# Patient Record
Sex: MALE | Race: BLACK OR AFRICAN AMERICAN | NOT HISPANIC OR LATINO | Employment: OTHER | URBAN - NONMETROPOLITAN AREA
[De-identification: names, ages, dates, MRNs, and addresses within clinical notes are randomized per-mention and may not be internally consistent; named-entity substitution may affect disease eponyms.]

---

## 2020-06-30 ENCOUNTER — LAB (OUTPATIENT)
Dept: LAB | Facility: HOSPITAL | Age: 75
End: 2020-06-30

## 2020-06-30 ENCOUNTER — OFFICE VISIT (OUTPATIENT)
Dept: INTERNAL MEDICINE | Facility: CLINIC | Age: 75
End: 2020-06-30

## 2020-06-30 ENCOUNTER — RESULTS ENCOUNTER (OUTPATIENT)
Dept: INTERNAL MEDICINE | Facility: CLINIC | Age: 75
End: 2020-06-30

## 2020-06-30 VITALS
DIASTOLIC BLOOD PRESSURE: 63 MMHG | HEIGHT: 65 IN | WEIGHT: 153.8 LBS | OXYGEN SATURATION: 98 % | RESPIRATION RATE: 18 BRPM | TEMPERATURE: 98.7 F | SYSTOLIC BLOOD PRESSURE: 116 MMHG | BODY MASS INDEX: 25.62 KG/M2 | HEART RATE: 73 BPM

## 2020-06-30 DIAGNOSIS — I10 ESSENTIAL HYPERTENSION: ICD-10-CM

## 2020-06-30 DIAGNOSIS — Z12.11 COLON CANCER SCREENING: ICD-10-CM

## 2020-06-30 DIAGNOSIS — R79.9 ABNORMAL FINDING OF BLOOD CHEMISTRY, UNSPECIFIED: ICD-10-CM

## 2020-06-30 DIAGNOSIS — E55.9 VITAMIN D DEFICIENCY: ICD-10-CM

## 2020-06-30 DIAGNOSIS — K21.9 GASTROESOPHAGEAL REFLUX DISEASE, ESOPHAGITIS PRESENCE NOT SPECIFIED: ICD-10-CM

## 2020-06-30 DIAGNOSIS — I10 ESSENTIAL HYPERTENSION: Primary | ICD-10-CM

## 2020-06-30 DIAGNOSIS — Z11.59 ENCOUNTER FOR HEPATITIS C SCREENING TEST FOR LOW RISK PATIENT: ICD-10-CM

## 2020-06-30 DIAGNOSIS — J45.40 MODERATE PERSISTENT ASTHMA WITHOUT COMPLICATION: ICD-10-CM

## 2020-06-30 DIAGNOSIS — E78.5 HYPERLIPIDEMIA, UNSPECIFIED HYPERLIPIDEMIA TYPE: ICD-10-CM

## 2020-06-30 DIAGNOSIS — N40.0 BENIGN PROSTATIC HYPERPLASIA, UNSPECIFIED WHETHER LOWER URINARY TRACT SYMPTOMS PRESENT: ICD-10-CM

## 2020-06-30 LAB
25(OH)D3 SERPL-MCNC: 52.2 NG/ML (ref 30–100)
BASOPHILS # BLD AUTO: 0.05 10*3/MM3 (ref 0–0.2)
BASOPHILS NFR BLD AUTO: 0.7 % (ref 0–1.5)
DEPRECATED RDW RBC AUTO: 43.7 FL (ref 37–54)
EOSINOPHIL # BLD AUTO: 0.11 10*3/MM3 (ref 0–0.4)
EOSINOPHIL NFR BLD AUTO: 1.5 % (ref 0.3–6.2)
ERYTHROCYTE [DISTWIDTH] IN BLOOD BY AUTOMATED COUNT: 12.5 % (ref 12.3–15.4)
HBA1C MFR BLD: 6.2 % (ref 4.8–5.6)
HCT VFR BLD AUTO: 44.3 % (ref 37.5–51)
HCV AB SER DONR QL: NORMAL
HGB BLD-MCNC: 15.1 G/DL (ref 13–17.7)
IMM GRANULOCYTES # BLD AUTO: 0.02 10*3/MM3 (ref 0–0.05)
IMM GRANULOCYTES NFR BLD AUTO: 0.3 % (ref 0–0.5)
LYMPHOCYTES # BLD AUTO: 1.2 10*3/MM3 (ref 0.7–3.1)
LYMPHOCYTES NFR BLD AUTO: 15.9 % (ref 19.6–45.3)
MCH RBC QN AUTO: 32.3 PG (ref 26.6–33)
MCHC RBC AUTO-ENTMCNC: 34.1 G/DL (ref 31.5–35.7)
MCV RBC AUTO: 94.9 FL (ref 79–97)
MONOCYTES # BLD AUTO: 0.91 10*3/MM3 (ref 0.1–0.9)
MONOCYTES NFR BLD AUTO: 12.1 % (ref 5–12)
NEUTROPHILS NFR BLD AUTO: 5.26 10*3/MM3 (ref 1.7–7)
NEUTROPHILS NFR BLD AUTO: 69.5 % (ref 42.7–76)
NRBC BLD AUTO-RTO: 0 /100 WBC (ref 0–0.2)
PLATELET # BLD AUTO: 251 10*3/MM3 (ref 140–450)
PMV BLD AUTO: 10.3 FL (ref 6–12)
RBC # BLD AUTO: 4.67 10*6/MM3 (ref 4.14–5.8)
WBC # BLD AUTO: 7.55 10*3/MM3 (ref 3.4–10.8)

## 2020-06-30 PROCEDURE — 36415 COLL VENOUS BLD VENIPUNCTURE: CPT

## 2020-06-30 PROCEDURE — 82306 VITAMIN D 25 HYDROXY: CPT | Performed by: INTERNAL MEDICINE

## 2020-06-30 PROCEDURE — 86803 HEPATITIS C AB TEST: CPT | Performed by: INTERNAL MEDICINE

## 2020-06-30 PROCEDURE — 99204 OFFICE O/P NEW MOD 45 MIN: CPT | Performed by: INTERNAL MEDICINE

## 2020-06-30 PROCEDURE — 80061 LIPID PANEL: CPT | Performed by: INTERNAL MEDICINE

## 2020-06-30 PROCEDURE — 80053 COMPREHEN METABOLIC PANEL: CPT | Performed by: INTERNAL MEDICINE

## 2020-06-30 PROCEDURE — 85025 COMPLETE CBC W/AUTO DIFF WBC: CPT | Performed by: INTERNAL MEDICINE

## 2020-06-30 PROCEDURE — 83036 HEMOGLOBIN GLYCOSYLATED A1C: CPT | Performed by: INTERNAL MEDICINE

## 2020-06-30 RX ORDER — ASCORBIC ACID 500 MG
500 TABLET ORAL DAILY
COMMUNITY
End: 2020-06-30 | Stop reason: SDUPTHER

## 2020-06-30 RX ORDER — BUDESONIDE 0.5 MG/2ML
0.5 INHALANT ORAL 2 TIMES DAILY
Qty: 100 EACH | Refills: 5 | Status: SHIPPED | OUTPATIENT
Start: 2020-06-30 | End: 2021-05-11 | Stop reason: SDUPTHER

## 2020-06-30 RX ORDER — ASCORBIC ACID 500 MG
500 TABLET ORAL DAILY
Qty: 30 TABLET | Refills: 5 | Status: SHIPPED | OUTPATIENT
Start: 2020-06-30

## 2020-06-30 RX ORDER — ALBUTEROL SULFATE 1.25 MG/3ML
1 SOLUTION RESPIRATORY (INHALATION) EVERY 6 HOURS PRN
COMMUNITY
End: 2020-06-30 | Stop reason: SDUPTHER

## 2020-06-30 RX ORDER — ASPIRIN 81 MG/1
81 TABLET ORAL DAILY
Qty: 30 TABLET | Refills: 5 | Status: SHIPPED | OUTPATIENT
Start: 2020-06-30

## 2020-06-30 RX ORDER — LOSARTAN POTASSIUM 50 MG/1
50 TABLET ORAL DAILY
COMMUNITY
End: 2020-06-30 | Stop reason: SDUPTHER

## 2020-06-30 RX ORDER — ALBUTEROL SULFATE 90 UG/1
2 AEROSOL, METERED RESPIRATORY (INHALATION) EVERY 6 HOURS PRN
Qty: 1 INHALER | Refills: 11 | Status: SHIPPED | OUTPATIENT
Start: 2020-06-30 | End: 2021-02-04 | Stop reason: CLARIF

## 2020-06-30 RX ORDER — LOSARTAN POTASSIUM 50 MG/1
50 TABLET ORAL DAILY
Qty: 30 TABLET | Refills: 5 | Status: SHIPPED | OUTPATIENT
Start: 2020-06-30 | End: 2020-12-18 | Stop reason: SDUPTHER

## 2020-06-30 RX ORDER — TAMSULOSIN HYDROCHLORIDE 0.4 MG/1
1 CAPSULE ORAL DAILY
Qty: 30 CAPSULE | Refills: 5 | Status: SHIPPED | OUTPATIENT
Start: 2020-06-30 | End: 2020-12-18 | Stop reason: SDUPTHER

## 2020-06-30 RX ORDER — HYDROCHLOROTHIAZIDE 12.5 MG/1
12.5 CAPSULE, GELATIN COATED ORAL EVERY MORNING
Qty: 30 CAPSULE | Refills: 5 | Status: SHIPPED | OUTPATIENT
Start: 2020-06-30 | End: 2020-12-18 | Stop reason: SDUPTHER

## 2020-06-30 RX ORDER — BUDESONIDE AND FORMOTEROL FUMARATE DIHYDRATE 160; 4.5 UG/1; UG/1
2 AEROSOL RESPIRATORY (INHALATION)
Qty: 1 INHALER | Refills: 12 | Status: SHIPPED | OUTPATIENT
Start: 2020-06-30 | End: 2020-06-30

## 2020-06-30 RX ORDER — MELATONIN
1000 DAILY
Qty: 30 TABLET | Refills: 5 | Status: SHIPPED | OUTPATIENT
Start: 2020-06-30

## 2020-06-30 RX ORDER — ASPIRIN 81 MG/1
81 TABLET ORAL DAILY
COMMUNITY
End: 2020-06-30 | Stop reason: SDUPTHER

## 2020-06-30 RX ORDER — MONTELUKAST SODIUM 10 MG/1
10 TABLET ORAL NIGHTLY
COMMUNITY
End: 2020-06-30 | Stop reason: SDUPTHER

## 2020-06-30 RX ORDER — SIMVASTATIN 20 MG
20 TABLET ORAL NIGHTLY
Qty: 30 TABLET | Refills: 5 | Status: SHIPPED | OUTPATIENT
Start: 2020-06-30 | End: 2020-12-18 | Stop reason: SDUPTHER

## 2020-06-30 RX ORDER — SIMVASTATIN 20 MG
20 TABLET ORAL NIGHTLY
COMMUNITY
End: 2020-06-30 | Stop reason: SDUPTHER

## 2020-06-30 RX ORDER — PANTOPRAZOLE SODIUM 40 MG/1
40 TABLET, DELAYED RELEASE ORAL DAILY
Qty: 30 TABLET | Refills: 5 | Status: SHIPPED | OUTPATIENT
Start: 2020-06-30 | End: 2020-12-18 | Stop reason: SDUPTHER

## 2020-06-30 RX ORDER — TAMSULOSIN HYDROCHLORIDE 0.4 MG/1
1 CAPSULE ORAL DAILY
COMMUNITY
End: 2020-06-30 | Stop reason: SDUPTHER

## 2020-06-30 RX ORDER — ALBUTEROL SULFATE 1.25 MG/3ML
1 SOLUTION RESPIRATORY (INHALATION) EVERY 6 HOURS PRN
Qty: 100 VIAL | Refills: 5 | Status: SHIPPED | OUTPATIENT
Start: 2020-06-30 | End: 2021-05-11 | Stop reason: SDUPTHER

## 2020-06-30 RX ORDER — HYDROCHLOROTHIAZIDE 12.5 MG/1
12.5 CAPSULE, GELATIN COATED ORAL EVERY MORNING
COMMUNITY
End: 2020-06-30 | Stop reason: SDUPTHER

## 2020-06-30 RX ORDER — ALBUTEROL SULFATE 90 UG/1
2 AEROSOL, METERED RESPIRATORY (INHALATION) EVERY 6 HOURS PRN
COMMUNITY
End: 2020-06-30 | Stop reason: SDUPTHER

## 2020-06-30 RX ORDER — MONTELUKAST SODIUM 10 MG/1
10 TABLET ORAL NIGHTLY
Qty: 30 TABLET | Refills: 5 | Status: SHIPPED | OUTPATIENT
Start: 2020-06-30

## 2020-06-30 RX ORDER — PANTOPRAZOLE SODIUM 40 MG/1
40 TABLET, DELAYED RELEASE ORAL DAILY
COMMUNITY
End: 2020-06-30 | Stop reason: SDUPTHER

## 2020-06-30 NOTE — PROGRESS NOTES
Chief Complaint   Patient presents with   • Establish Care         History:  Abhishek Mendieta is a 74 y.o. male who presents today for evaluation of the above problems.      Patient is a pleasant 74-year-old male here to establish care with me.  He moved from [place] on June 5, 2020.  He has BPH, hyperlipidemia, hypertension and asthma.  He is due for labs and unfortunately I do not have any records from his previous provider.    His blood pressure has been currently well controlled with hydrochlorothiazide 12.5 mg daily, losartan 50 mg daily.    He has moderate persistent asthma.  He has been using his albuterol nebulizer about 4 times a day.  In addition he has been using Pulmicort twice a day.  He was previously on Symbicort but this was not helpful.  I suspect it was due to poor effort, or inability to use the inhaler correctly.    He is on simvastatin 20 mg daily for hyperlipidemia    He has never had a heart attack or stroke    He is accompanied by his younger sister at the office today as the patient is a poor historian    He has never had a colonoscopy but is interested in a Cologuard.          HPI    ROS:  Review of Systems   Constitutional: Negative for activity change, appetite change, fatigue, fever and unexpected weight change.   HENT: Negative for nosebleeds, sore throat and trouble swallowing.    Eyes: Negative for pain and visual disturbance.   Respiratory: Positive for shortness of breath and wheezing. Negative for cough and chest tightness.    Cardiovascular: Negative for chest pain, palpitations and leg swelling.   Gastrointestinal: Negative for abdominal pain, constipation, diarrhea, nausea and vomiting.   Endocrine: Negative for cold intolerance and heat intolerance.   Genitourinary: Negative.  Negative for hematuria.   Musculoskeletal: Negative.  Negative for back pain, neck pain and neck stiffness.   Skin: Negative for rash and wound.   Neurological: Negative for dizziness, syncope, weakness,  light-headedness and headaches.   Hematological: Negative for adenopathy. Does not bruise/bleed easily.   Psychiatric/Behavioral: Positive for decreased concentration. Negative for agitation, behavioral problems and confusion.       No Known Allergies  Past Medical History:   Diagnosis Date   • Anxiety    • Hypertension      Past Surgical History:   Procedure Laterality Date   • PROSTATE SURGERY       Family History   Problem Relation Age of Onset   • Diabetes Mother    • Heart disease Mother    • Kidney disease Mother    • Cancer Father    • Asthma Brother    • Cancer Brother      Abhishek  reports that he has never smoked. He has never used smokeless tobacco. He reports that he does not drink alcohol or use drugs.    I have reviewed and updated the above documentation (if necessary) including but not limited to chief complaint, ROS, PFSH, allergies and medications        Current Outpatient Medications:   •  albuterol (ACCUNEB) 1.25 MG/3ML nebulizer solution, Take 3 mL by nebulization Every 6 (Six) Hours As Needed for Wheezing., Disp: 100 vial, Rfl: 5  •  albuterol sulfate  (90 Base) MCG/ACT inhaler, Inhale 2 puffs Every 6 (Six) Hours As Needed for Wheezing., Disp: 1 inhaler, Rfl: 11  •  aspirin 81 MG EC tablet, Take 1 tablet by mouth Daily., Disp: 30 tablet, Rfl: 5  •  hydroCHLOROthiazide (MICROZIDE) 12.5 MG capsule, Take 1 capsule by mouth Every Morning., Disp: 30 capsule, Rfl: 5  •  losartan (COZAAR) 50 MG tablet, Take 1 tablet by mouth Daily., Disp: 30 tablet, Rfl: 5  •  montelukast (SINGULAIR) 10 MG tablet, Take 1 tablet by mouth Every Night., Disp: 30 tablet, Rfl: 5  •  pantoprazole (PROTONIX) 40 MG EC tablet, Take 1 tablet by mouth Daily., Disp: 30 tablet, Rfl: 5  •  simvastatin (ZOCOR) 20 MG tablet, Take 1 tablet by mouth Every Night., Disp: 30 tablet, Rfl: 5  •  tamsulosin (FLOMAX) 0.4 MG capsule 24 hr capsule, Take 1 capsule by mouth Daily., Disp: 30 capsule, Rfl: 5  •  vitamin C (ASCORBIC ACID) 500  "MG tablet, Take 1 tablet by mouth Daily., Disp: 30 tablet, Rfl: 5  •  budesonide (PULMICORT) 0.5 MG/2ML nebulizer solution, Take 2 mL by nebulization 2 (two) times a day., Disp: 100 each, Rfl: 5  •  cholecalciferol (VITAMIN D3) 25 MCG (1000 UT) tablet, Take 1 tablet by mouth Daily., Disp: 30 tablet, Rfl: 5    PHQ-9 Depression Screening  Little interest or pleasure in doing things?     Feeling down, depressed, or hopeless?     Trouble falling or staying asleep, or sleeping too much?     Feeling tired or having little energy?     Poor appetite or overeating?     Feeling bad about yourself - or that you are a failure or have let yourself or your family down?     Trouble concentrating on things, such as reading the newspaper or watching television?     Moving or speaking so slowly that other people could have noticed? Or the opposite - being so fidgety or restless that you have been moving around a lot more than usual?     Thoughts that you would be better off dead, or of hurting yourself in some way?     PHQ-9 Total Score     If you checked off any problems, how difficult have these problems made it for you to do your work, take care of things at home, or get along with other people?       PHQ-9 Total Score:      OBJECTIVE:  Visit Vitals  /63 (BP Location: Left arm, Patient Position: Sitting, Cuff Size: Adult)   Pulse 73   Temp 98.7 °F (37.1 °C) (Oral)   Resp 18   Ht 165.1 cm (65\")   Wt 69.8 kg (153 lb 12.8 oz)   SpO2 98%   BMI 25.59 kg/m²      Physical Exam   Constitutional: He appears well-developed and well-nourished. No distress.   HENT:   Head: Normocephalic and atraumatic.   Right Ear: External ear normal.   Left Ear: External ear normal.   Eyes: Pupils are equal, round, and reactive to light. EOM are normal.   Neck: Phonation normal. No JVD present. No thyromegaly present.   Cardiovascular: Normal rate and regular rhythm. Exam reveals no friction rub.   No murmur heard.  No lower extremity edema "   Pulmonary/Chest: Effort normal and breath sounds normal. No stridor. No respiratory distress. He has no wheezes. He has no rales.   Abdominal: Soft. Bowel sounds are normal. He exhibits no distension and no mass. There is no tenderness. There is no guarding.   Neurological: He is alert.   Skin: Skin is warm and dry. No erythema. No pallor.   Psychiatric: He has a normal mood and affect. His behavior is normal. Judgment and thought content normal. His speech is delayed.   Very pleasant   Vitals reviewed.      MDM    Assessment/Plan    Abhishek was seen today for establish care.    Diagnoses and all orders for this visit:    Essential hypertension  -     losartan (COZAAR) 50 MG tablet; Take 1 tablet by mouth Daily.  -     hydroCHLOROthiazide (MICROZIDE) 12.5 MG capsule; Take 1 capsule by mouth Every Morning.  -     Hemoglobin A1c; Future  -     CBC & Differential; Future    Hyperlipidemia, unspecified hyperlipidemia type  -     simvastatin (ZOCOR) 20 MG tablet; Take 1 tablet by mouth Every Night.  -     Lipid Panel; Future  -     Comprehensive Metabolic Panel; Future    Gastroesophageal reflux disease, esophagitis presence not specified  -     pantoprazole (PROTONIX) 40 MG EC tablet; Take 1 tablet by mouth Daily.    Benign prostatic hyperplasia, unspecified whether lower urinary tract symptoms present  -     tamsulosin (FLOMAX) 0.4 MG capsule 24 hr capsule; Take 1 capsule by mouth Daily.    Moderate persistent asthma without complication  -     Discontinue: budesonide-formoterol (Symbicort) 160-4.5 MCG/ACT inhaler; Inhale 2 puffs 2 (Two) Times a Day.  -     albuterol (ACCUNEB) 1.25 MG/3ML nebulizer solution; Take 3 mL by nebulization Every 6 (Six) Hours As Needed for Wheezing.  -     montelukast (SINGULAIR) 10 MG tablet; Take 1 tablet by mouth Every Night.  -     albuterol sulfate  (90 Base) MCG/ACT inhaler; Inhale 2 puffs Every 6 (Six) Hours As Needed for Wheezing.  -     budesonide (PULMICORT) 0.5 MG/2ML  nebulizer solution; Take 2 mL by nebulization 2 (two) times a day.    Vitamin D deficiency  -     Vitamin D 25 hydroxy; Future    Encounter for hepatitis C screening test for low risk patient  -     Hepatitis C Antibody; Future    Abnormal finding of blood chemistry, unspecified   -     Hemoglobin A1c; Future    Colon cancer screening  -     Cologuard - Stool, Per Rectum; Future    Other orders  -     aspirin 81 MG EC tablet; Take 1 tablet by mouth Daily.  -     vitamin C (ASCORBIC ACID) 500 MG tablet; Take 1 tablet by mouth Daily.  -     cholecalciferol (VITAMIN D3) 25 MCG (1000 UT) tablet; Take 1 tablet by mouth Daily.      Only to obtain full work-up as above.  I would like to get labs including CBC CMP lipid panel hepatitis C antibody A1c and vitamin D.    Refill all of his medications as above.  I do not think he would be able to correctly use Symbicort and therefore I am refilling his Pulmicort.    Cologuard as he does not want a colonoscopy but is agreeable to colonoscopy if the Cologuard is positive.  He has never had colon cancer screening    Follow-up in about 6 months for Medicare wellness visit or sooner if clinically indicated    Patient's Body mass index is 25.59 kg/m². BMI is above normal parameters. Recommendations include: exercise counseling and nutrition counseling.      Education materials and an After Visit Summary were printed and given to the patient at discharge.  Return in about 6 months (around 12/30/2020) for Medicare Wellness.         NAV Caputo MD   8:57 AM  6/30/2020

## 2020-07-01 PROBLEM — E55.9 VITAMIN D DEFICIENCY: Status: ACTIVE | Noted: 2020-07-01

## 2020-07-01 PROBLEM — I10 ESSENTIAL HYPERTENSION: Status: ACTIVE | Noted: 2020-07-01

## 2020-07-01 PROBLEM — R73.03 PREDIABETES: Status: ACTIVE | Noted: 2020-07-01

## 2020-07-01 PROBLEM — J45.40 MODERATE PERSISTENT ASTHMA WITHOUT COMPLICATION: Status: ACTIVE | Noted: 2020-07-01

## 2020-07-01 PROBLEM — N40.0 BENIGN PROSTATIC HYPERPLASIA: Status: ACTIVE | Noted: 2020-07-01

## 2020-07-01 PROBLEM — E78.5 HYPERLIPIDEMIA: Status: ACTIVE | Noted: 2020-07-01

## 2020-07-01 PROBLEM — K21.9 GASTROESOPHAGEAL REFLUX DISEASE: Status: ACTIVE | Noted: 2020-07-01

## 2020-07-01 LAB
ALBUMIN SERPL-MCNC: 4.4 G/DL (ref 3.5–5.2)
ALBUMIN/GLOB SERPL: 1.5 G/DL
ALP SERPL-CCNC: 53 U/L (ref 39–117)
ALT SERPL W P-5'-P-CCNC: 22 U/L (ref 1–41)
ANION GAP SERPL CALCULATED.3IONS-SCNC: 11.7 MMOL/L (ref 5–15)
AST SERPL-CCNC: 18 U/L (ref 1–40)
BILIRUB SERPL-MCNC: 0.5 MG/DL (ref 0.2–1.2)
BUN SERPL-MCNC: 20 MG/DL (ref 8–23)
BUN/CREAT SERPL: 21.1 (ref 7–25)
CALCIUM SPEC-SCNC: 10 MG/DL (ref 8.6–10.5)
CHLORIDE SERPL-SCNC: 100 MMOL/L (ref 98–107)
CHOLEST SERPL-MCNC: 161 MG/DL (ref 0–200)
CO2 SERPL-SCNC: 22.3 MMOL/L (ref 22–29)
CREAT SERPL-MCNC: 0.95 MG/DL (ref 0.76–1.27)
GFR SERPL CREATININE-BSD FRML MDRD: 94 ML/MIN/1.73
GLOBULIN UR ELPH-MCNC: 2.9 GM/DL
GLUCOSE SERPL-MCNC: 116 MG/DL (ref 65–99)
HDLC SERPL-MCNC: 52 MG/DL (ref 40–60)
LDLC SERPL CALC-MCNC: 78 MG/DL (ref 0–100)
LDLC/HDLC SERPL: 1.49 {RATIO}
POTASSIUM SERPL-SCNC: 3.5 MMOL/L (ref 3.5–5.2)
PROT SERPL-MCNC: 7.3 G/DL (ref 6–8.5)
SODIUM SERPL-SCNC: 134 MMOL/L (ref 136–145)
TRIGL SERPL-MCNC: 157 MG/DL (ref 0–150)
VLDLC SERPL-MCNC: 31.4 MG/DL (ref 5–40)

## 2020-07-02 NOTE — PROGRESS NOTES
Tried to call patient. No answer and was unable to leave a voicemail. Going to mail out letter today.

## 2020-08-31 ENCOUNTER — TELEPHONE (OUTPATIENT)
Dept: INTERNAL MEDICINE | Facility: CLINIC | Age: 75
End: 2020-08-31

## 2020-12-18 ENCOUNTER — OFFICE VISIT (OUTPATIENT)
Dept: INTERNAL MEDICINE | Facility: CLINIC | Age: 75
End: 2020-12-18

## 2020-12-18 VITALS
DIASTOLIC BLOOD PRESSURE: 60 MMHG | HEIGHT: 65 IN | TEMPERATURE: 98.7 F | RESPIRATION RATE: 15 BRPM | WEIGHT: 155.9 LBS | HEART RATE: 74 BPM | OXYGEN SATURATION: 98 % | BODY MASS INDEX: 25.97 KG/M2 | SYSTOLIC BLOOD PRESSURE: 120 MMHG

## 2020-12-18 DIAGNOSIS — I49.9 IRREGULAR HEART BEAT: ICD-10-CM

## 2020-12-18 DIAGNOSIS — E78.5 HYPERLIPIDEMIA, UNSPECIFIED HYPERLIPIDEMIA TYPE: ICD-10-CM

## 2020-12-18 DIAGNOSIS — I10 ESSENTIAL HYPERTENSION: ICD-10-CM

## 2020-12-18 DIAGNOSIS — K21.9 GASTROESOPHAGEAL REFLUX DISEASE: ICD-10-CM

## 2020-12-18 DIAGNOSIS — R73.03 PREDIABETES: ICD-10-CM

## 2020-12-18 DIAGNOSIS — N40.0 BENIGN PROSTATIC HYPERPLASIA, UNSPECIFIED WHETHER LOWER URINARY TRACT SYMPTOMS PRESENT: ICD-10-CM

## 2020-12-18 DIAGNOSIS — Z00.00 MEDICARE ANNUAL WELLNESS VISIT, SUBSEQUENT: Primary | ICD-10-CM

## 2020-12-18 LAB — HBA1C MFR BLD: 5.9 %

## 2020-12-18 PROCEDURE — 93000 ELECTROCARDIOGRAM COMPLETE: CPT | Performed by: INTERNAL MEDICINE

## 2020-12-18 PROCEDURE — 83036 HEMOGLOBIN GLYCOSYLATED A1C: CPT | Performed by: INTERNAL MEDICINE

## 2020-12-18 PROCEDURE — G0439 PPPS, SUBSEQ VISIT: HCPCS | Performed by: INTERNAL MEDICINE

## 2020-12-18 RX ORDER — TAMSULOSIN HYDROCHLORIDE 0.4 MG/1
1 CAPSULE ORAL DAILY
Qty: 30 CAPSULE | Refills: 5 | Status: SHIPPED | OUTPATIENT
Start: 2020-12-18 | End: 2021-07-02

## 2020-12-18 RX ORDER — LOSARTAN POTASSIUM 50 MG/1
50 TABLET ORAL DAILY
Qty: 30 TABLET | Refills: 5 | Status: SHIPPED | OUTPATIENT
Start: 2020-12-18 | End: 2021-07-01

## 2020-12-18 RX ORDER — SIMVASTATIN 20 MG
20 TABLET ORAL NIGHTLY
Qty: 30 TABLET | Refills: 5 | Status: SHIPPED | OUTPATIENT
Start: 2020-12-18 | End: 2021-08-02

## 2020-12-18 RX ORDER — HYDROCHLOROTHIAZIDE 12.5 MG/1
12.5 CAPSULE, GELATIN COATED ORAL EVERY MORNING
Qty: 30 CAPSULE | Refills: 5 | Status: SHIPPED | OUTPATIENT
Start: 2020-12-18 | End: 2021-07-01

## 2020-12-18 RX ORDER — PANTOPRAZOLE SODIUM 40 MG/1
40 TABLET, DELAYED RELEASE ORAL DAILY
Qty: 30 TABLET | Refills: 5 | Status: SHIPPED | OUTPATIENT
Start: 2020-12-18 | End: 2021-06-30

## 2020-12-18 NOTE — PROGRESS NOTES
The ABCs of the Annual Wellness Visit  Subsequent Medicare Wellness Visit    Chief Complaint   Patient presents with   • Medicare Wellness-subsequent       Subjective   History of Present Illness:  Abhishek Mendieta is a 75 y.o. male who presents for a Subsequent Medicare Wellness Visit.    Sister is at the bedside and much of the history is obtained from her    HEALTH RISK ASSESSMENT    Recent Hospitalizations:  No hospitalization(s) within the last year.    Current Medical Providers:  Patient Care Team:  DENNIS Caputo MD as PCP - General (Internal Medicine)    Smoking Status:  Social History     Tobacco Use   Smoking Status Never Smoker   Smokeless Tobacco Never Used       Alcohol Consumption:  Social History     Substance and Sexual Activity   Alcohol Use Never   • Frequency: Never       Depression Screen:   No flowsheet data found.    Fall Risk Screen:  STEADI Fall Risk Assessment was completed, and patient is at LOW risk for falls.Assessment completed on:12/18/2020    Health Habits and Functional and Cognitive Screening:  Functional & Cognitive Status 12/18/2020   Do you have difficulty preparing food and eating? No   Do you have difficulty bathing yourself, getting dressed or grooming yourself? No   Do you have difficulty using the toilet? No   Do you have difficulty moving around from place to place? No   Do you have trouble with steps or getting out of a bed or a chair? No   Current Diet Well Balanced Diet   Dental Exam Not up to date   Eye Exam Not up to date   Exercise (times per week) 0 times per week   Current Exercise Activities Include None   Do you need help using the phone?  Yes   Are you deaf or do you have serious difficulty hearing?  Yes   Do you need help with transportation? No   Do you need help shopping? Yes   Do you need help preparing meals?  Yes   Do you need help with housework?  Yes   Do you need help with laundry? Yes   Do you need help taking your medications? Yes   Do you need help  managing money? Yes   Do you ever drive or ride in a car without wearing a seat belt? No   Have you felt unusual stress, anger or loneliness in the last month? No   Who do you live with? Sibling   If you need help, do you have trouble finding someone available to you? No   Have you been bothered in the last four weeks by sexual problems? No   Do you have difficulty concentrating, remembering or making decisions? Yes       Does the patient have evidence of cognitive impairment? Yes    Asprin use counseling:Taking ASA appropriately as indicated     The 10-year ASCVD risk score (Deshawn HARVEY Jr., et al., 2013) is: 19%    Values used to calculate the score:      Age: 75 years      Sex: Male      Is Non- : Yes      Diabetic: No      Tobacco smoker: No      Systolic Blood Pressure: 120 mmHg      Is BP treated: Yes      HDL Cholesterol: 52 mg/dL      Total Cholesterol: 161 mg/dL      Age-appropriate Screening Schedule:  Refer to the list below for future screening recommendations based on patient's age, sex and/or medical conditions. Orders for these recommended tests are listed in the plan section. The patient has been provided with a written plan.    Health Maintenance   Topic Date Due   • TDAP/TD VACCINES (1 - Tdap) 11/28/1963   • ZOSTER VACCINE (1 of 2) 11/28/1994   • LIPID PANEL  06/30/2021   • INFLUENZA VACCINE  Completed          The following portions of the patient's history were reviewed and updated as appropriate: allergies, current medications, past family history, past medical history, past social history, past surgical history and problem list.    Outpatient Medications Prior to Visit   Medication Sig Dispense Refill   • albuterol (ACCUNEB) 1.25 MG/3ML nebulizer solution Take 3 mL by nebulization Every 6 (Six) Hours As Needed for Wheezing. 100 vial 5   • albuterol sulfate  (90 Base) MCG/ACT inhaler Inhale 2 puffs Every 6 (Six) Hours As Needed for Wheezing. 1 inhaler 11   • aspirin 81  MG EC tablet Take 1 tablet by mouth Daily. 30 tablet 5   • budesonide (PULMICORT) 0.5 MG/2ML nebulizer solution Take 2 mL by nebulization 2 (two) times a day. 100 each 5   • cholecalciferol (VITAMIN D3) 25 MCG (1000 UT) tablet Take 1 tablet by mouth Daily. 30 tablet 5   • montelukast (SINGULAIR) 10 MG tablet Take 1 tablet by mouth Every Night. 30 tablet 5   • vitamin C (ASCORBIC ACID) 500 MG tablet Take 1 tablet by mouth Daily. 30 tablet 5   • hydroCHLOROthiazide (MICROZIDE) 12.5 MG capsule Take 1 capsule by mouth Every Morning. 30 capsule 5   • losartan (COZAAR) 50 MG tablet Take 1 tablet by mouth Daily. 30 tablet 5   • pantoprazole (PROTONIX) 40 MG EC tablet Take 1 tablet by mouth Daily. 30 tablet 5   • simvastatin (ZOCOR) 20 MG tablet Take 1 tablet by mouth Every Night. 30 tablet 5   • tamsulosin (FLOMAX) 0.4 MG capsule 24 hr capsule Take 1 capsule by mouth Daily. 30 capsule 5     No facility-administered medications prior to visit.        Patient Active Problem List   Diagnosis   • Prediabetes   • Essential hypertension   • Hyperlipidemia   • Gastroesophageal reflux disease   • Benign prostatic hyperplasia   • Moderate persistent asthma without complication   • Vitamin D deficiency       Advanced Care Planning:  ACP discussion was held with the patient during this visit. Patient does not have an advance directive, information provided.    Review of Systems   Constitutional: Negative for activity change, appetite change, fatigue, fever and unexpected weight change.   HENT: Negative for nosebleeds, sore throat and trouble swallowing.    Respiratory: Positive for shortness of breath and wheezing. Negative for cough and chest tightness.    Cardiovascular: Negative for chest pain, palpitations and leg swelling.   Gastrointestinal: Negative for abdominal pain, constipation, diarrhea, nausea and vomiting.   Genitourinary: Negative.  Negative for hematuria.   Musculoskeletal: Negative.  Negative for back pain, neck pain  "and neck stiffness.   Skin: Negative for rash and wound.   Neurological: Negative for dizziness, syncope, weakness, light-headedness and headaches.   Hematological: Negative for adenopathy. Does not bruise/bleed easily.   Psychiatric/Behavioral: Positive for decreased concentration. Negative for agitation, behavioral problems and confusion.       Compared to one year ago, the patient feels his physical health is the same.  Compared to one year ago, the patient feels his mental health is the same.    Reviewed chart for potential of high risk medication in the elderly: yes  Reviewed chart for potential of harmful drug interactions in the elderly:yes    Objective         Vitals:    12/18/20 1522   BP: 120/60   BP Location: Left arm   Patient Position: Sitting   Cuff Size: Adult   Pulse: 74   Resp: 15   Temp: 98.7 °F (37.1 °C)   TempSrc: Oral   SpO2: 98%   Weight: 70.7 kg (155 lb 14.4 oz)   Height: 165.1 cm (65\")       Body mass index is 25.94 kg/m².  Discussed the patient's BMI with him. The BMI is in the acceptable range  .    Physical Exam  Constitutional:       General: He is not in acute distress.     Appearance: He is well-developed.   HENT:      Head: Normocephalic and atraumatic.   Eyes:      Pupils: Pupils are equal, round, and reactive to light.   Neck:      Thyroid: No thyromegaly.      Trachea: Phonation normal.   Cardiovascular:      Rate and Rhythm: Normal rate. Rhythm irregular.      Heart sounds: No murmur.   Pulmonary:      Effort: Pulmonary effort is normal. No respiratory distress.      Breath sounds: Wheezing (Mild end expiratory) present.   Skin:     General: Skin is warm and dry.      Coloration: Skin is not jaundiced or pale.      Findings: No bruising or erythema.   Neurological:      Mental Status: He is alert. Mental status is at baseline.   Psychiatric:         Behavior: Behavior normal.         Thought Content: Thought content normal.         Judgment: Judgment normal.         Lab Results "   Component Value Date    HGBA1C 5.9 12/18/2020        ECG 12 Lead    Date/Time: 12/21/2020 7:34 AM  Performed by: DENNIS Caputo MD  Authorized by: DENNIS Caputo MD   Comparison: not compared with previous ECG   Previous ECG: no previous ECG available  Rate: normal  BPM: 62  Conduction: conduction normal  ST Segments: ST segments normal  T Waves: T waves normal  Other findings: non-specific ST-T wave changes    Clinical impression: normal ECG  Comments: QTc 379              Assessment/Plan   Medicare Risks and Personalized Health Plan  CMS Preventative Services Quick Reference  Advance Directive Discussion  Cardiovascular risk  Depression/Dysphoria  Diabetic Lab Screening   Immunizations Discussed/Encouraged (specific immunizations; COVID-19 )  Inadequate Social Support, Isolation, Loneliness, Lack of Transportation, Financial Difficulties, or Caregiver Stress   Inactivity/Sedentary  Polypharmacy    The above risks/problems have been discussed with the patient.  Pertinent information has been shared with the patient in the After Visit Summary.  Follow up plans and orders are seen below in the Assessment/Plan Section.    Diagnoses and all orders for this visit:    1. Medicare annual wellness visit, subsequent (Primary)    2. Essential hypertension  -     losartan (COZAAR) 50 MG tablet; Take 1 tablet by mouth Daily.  Dispense: 30 tablet; Refill: 5  -     hydroCHLOROthiazide (MICROZIDE) 12.5 MG capsule; Take 1 capsule by mouth Every Morning.  Dispense: 30 capsule; Refill: 5    3. Hyperlipidemia, unspecified hyperlipidemia type  -     simvastatin (ZOCOR) 20 MG tablet; Take 1 tablet by mouth Every Night.  Dispense: 30 tablet; Refill: 5    4. Prediabetes  -     POC Glycosylated Hemoglobin (Hb A1C)    5. Gastroesophageal reflux disease  -     pantoprazole (PROTONIX) 40 MG EC tablet; Take 1 tablet by mouth Daily.  Dispense: 30 tablet; Refill: 5    6. Benign prostatic hyperplasia, unspecified whether lower urinary  tract symptoms present  -     tamsulosin (FLOMAX) 0.4 MG capsule 24 hr capsule; Take 1 capsule by mouth Daily.  Dispense: 30 capsule; Refill: 5    7. Irregular heart beat  -     ECG 12 Lead      He is doing well.  Refill medications as above.    In regards to his prediabetes his A1c is now 5.9.    Did obtain a EKG in the office today for irregular heartbeat.  This was unremarkable.  See complete details above.    Laboratory data not needed at this time but will get it at the next visit.    Follow-up in 6 months or sooner if clinically indicated  Follow Up:  Return in about 6 months (around 6/18/2021) for Recheck.     An After Visit Summary and PPPS were given to the patient.

## 2020-12-21 PROCEDURE — 93000 ELECTROCARDIOGRAM COMPLETE: CPT | Performed by: INTERNAL MEDICINE

## 2021-02-04 RX ORDER — ALBUTEROL SULFATE 90 UG/1
2 AEROSOL, METERED RESPIRATORY (INHALATION) EVERY 4 HOURS PRN
Qty: 18 G | Refills: 5 | Status: SHIPPED | OUTPATIENT
Start: 2021-02-04

## 2021-05-11 DIAGNOSIS — J45.40 MODERATE PERSISTENT ASTHMA WITHOUT COMPLICATION: ICD-10-CM

## 2021-05-11 RX ORDER — BUDESONIDE 0.5 MG/2ML
0.5 INHALANT ORAL 2 TIMES DAILY
Qty: 100 EACH | Refills: 5 | Status: SHIPPED | OUTPATIENT
Start: 2021-05-11 | End: 2022-03-07

## 2021-05-11 RX ORDER — ALBUTEROL SULFATE 1.25 MG/3ML
1 SOLUTION RESPIRATORY (INHALATION) EVERY 6 HOURS PRN
Qty: 100 EACH | Refills: 3 | Status: SHIPPED | OUTPATIENT
Start: 2021-05-11

## 2021-06-25 ENCOUNTER — LAB (OUTPATIENT)
Dept: LAB | Facility: HOSPITAL | Age: 76
End: 2021-06-25

## 2021-06-25 ENCOUNTER — OFFICE VISIT (OUTPATIENT)
Dept: INTERNAL MEDICINE | Facility: CLINIC | Age: 76
End: 2021-06-25

## 2021-06-25 VITALS
SYSTOLIC BLOOD PRESSURE: 122 MMHG | HEIGHT: 64 IN | BODY MASS INDEX: 26.46 KG/M2 | DIASTOLIC BLOOD PRESSURE: 64 MMHG | HEART RATE: 76 BPM | OXYGEN SATURATION: 95 % | WEIGHT: 155 LBS

## 2021-06-25 DIAGNOSIS — J45.40 MODERATE PERSISTENT ASTHMA WITHOUT COMPLICATION: Primary | ICD-10-CM

## 2021-06-25 DIAGNOSIS — J30.9 ALLERGIC RHINITIS, UNSPECIFIED SEASONALITY, UNSPECIFIED TRIGGER: ICD-10-CM

## 2021-06-25 DIAGNOSIS — E55.9 VITAMIN D DEFICIENCY: ICD-10-CM

## 2021-06-25 DIAGNOSIS — E78.5 HYPERLIPIDEMIA, UNSPECIFIED HYPERLIPIDEMIA TYPE: ICD-10-CM

## 2021-06-25 DIAGNOSIS — I10 ESSENTIAL HYPERTENSION: ICD-10-CM

## 2021-06-25 DIAGNOSIS — R73.03 PREDIABETES: ICD-10-CM

## 2021-06-25 LAB
25(OH)D3 SERPL-MCNC: 29 NG/ML (ref 30–100)
ALBUMIN SERPL-MCNC: 4.5 G/DL (ref 3.5–5.2)
ALBUMIN/GLOB SERPL: 1.7 G/DL
ALP SERPL-CCNC: 64 U/L (ref 39–117)
ALT SERPL W P-5'-P-CCNC: 29 U/L (ref 1–41)
ANION GAP SERPL CALCULATED.3IONS-SCNC: 11.2 MMOL/L (ref 5–15)
AST SERPL-CCNC: 23 U/L (ref 1–40)
BASOPHILS # BLD AUTO: 0.06 10*3/MM3 (ref 0–0.2)
BASOPHILS NFR BLD AUTO: 0.8 % (ref 0–1.5)
BILIRUB SERPL-MCNC: 0.3 MG/DL (ref 0–1.2)
BUN SERPL-MCNC: 19 MG/DL (ref 8–23)
BUN/CREAT SERPL: 18.6 (ref 7–25)
CALCIUM SPEC-SCNC: 9.6 MG/DL (ref 8.6–10.5)
CHLORIDE SERPL-SCNC: 102 MMOL/L (ref 98–107)
CHOLEST SERPL-MCNC: 157 MG/DL (ref 0–200)
CO2 SERPL-SCNC: 24.8 MMOL/L (ref 22–29)
CREAT SERPL-MCNC: 1.02 MG/DL (ref 0.76–1.27)
DEPRECATED RDW RBC AUTO: 41.7 FL (ref 37–54)
EOSINOPHIL # BLD AUTO: 0.18 10*3/MM3 (ref 0–0.4)
EOSINOPHIL NFR BLD AUTO: 2.5 % (ref 0.3–6.2)
ERYTHROCYTE [DISTWIDTH] IN BLOOD BY AUTOMATED COUNT: 12.2 % (ref 12.3–15.4)
GFR SERPL CREATININE-BSD FRML MDRD: 86 ML/MIN/1.73
GLOBULIN UR ELPH-MCNC: 2.7 GM/DL
GLUCOSE SERPL-MCNC: 92 MG/DL (ref 65–99)
HBA1C MFR BLD: 5.7 % (ref 4.8–5.6)
HCT VFR BLD AUTO: 42.9 % (ref 37.5–51)
HDLC SERPL-MCNC: 53 MG/DL (ref 40–60)
HGB BLD-MCNC: 14.5 G/DL (ref 13–17.7)
IMM GRANULOCYTES # BLD AUTO: 0.03 10*3/MM3 (ref 0–0.05)
IMM GRANULOCYTES NFR BLD AUTO: 0.4 % (ref 0–0.5)
LDLC SERPL CALC-MCNC: 80 MG/DL (ref 0–100)
LDLC/HDLC SERPL: 1.44 {RATIO}
LYMPHOCYTES # BLD AUTO: 1.54 10*3/MM3 (ref 0.7–3.1)
LYMPHOCYTES NFR BLD AUTO: 21.1 % (ref 19.6–45.3)
MCH RBC QN AUTO: 31.7 PG (ref 26.6–33)
MCHC RBC AUTO-ENTMCNC: 33.8 G/DL (ref 31.5–35.7)
MCV RBC AUTO: 93.9 FL (ref 79–97)
MONOCYTES # BLD AUTO: 0.63 10*3/MM3 (ref 0.1–0.9)
MONOCYTES NFR BLD AUTO: 8.6 % (ref 5–12)
NEUTROPHILS NFR BLD AUTO: 4.85 10*3/MM3 (ref 1.7–7)
NEUTROPHILS NFR BLD AUTO: 66.6 % (ref 42.7–76)
NRBC BLD AUTO-RTO: 0 /100 WBC (ref 0–0.2)
PLATELET # BLD AUTO: 256 10*3/MM3 (ref 140–450)
PMV BLD AUTO: 10.9 FL (ref 6–12)
POTASSIUM SERPL-SCNC: 3.9 MMOL/L (ref 3.5–5.2)
PROT SERPL-MCNC: 7.2 G/DL (ref 6–8.5)
RBC # BLD AUTO: 4.57 10*6/MM3 (ref 4.14–5.8)
SODIUM SERPL-SCNC: 138 MMOL/L (ref 136–145)
TRIGL SERPL-MCNC: 139 MG/DL (ref 0–150)
VLDLC SERPL-MCNC: 24 MG/DL (ref 5–40)
WBC # BLD AUTO: 7.29 10*3/MM3 (ref 3.4–10.8)

## 2021-06-25 PROCEDURE — 83036 HEMOGLOBIN GLYCOSYLATED A1C: CPT

## 2021-06-25 PROCEDURE — 85025 COMPLETE CBC W/AUTO DIFF WBC: CPT

## 2021-06-25 PROCEDURE — 99214 OFFICE O/P EST MOD 30 MIN: CPT | Performed by: INTERNAL MEDICINE

## 2021-06-25 PROCEDURE — 80053 COMPREHEN METABOLIC PANEL: CPT

## 2021-06-25 PROCEDURE — 36415 COLL VENOUS BLD VENIPUNCTURE: CPT

## 2021-06-25 PROCEDURE — 82306 VITAMIN D 25 HYDROXY: CPT

## 2021-06-25 PROCEDURE — 80061 LIPID PANEL: CPT

## 2021-06-25 RX ORDER — CETIRIZINE HYDROCHLORIDE 10 MG/1
10 TABLET ORAL DAILY
Qty: 30 TABLET | Refills: 11 | Status: SHIPPED | OUTPATIENT
Start: 2021-06-25

## 2021-06-25 RX ORDER — TIOTROPIUM BROMIDE INHALATION SPRAY 3.12 UG/1
2 SPRAY, METERED RESPIRATORY (INHALATION)
Qty: 1 EACH | Refills: 5 | Status: SHIPPED | OUTPATIENT
Start: 2021-06-25 | End: 2022-10-03

## 2021-06-25 NOTE — PROGRESS NOTES
Chief Complaint   Patient presents with   • Follow-up         History:  Abhishek Mendieta is a 75 y.o. male who presents today for evaluation of the above problems.      He presents for 6-month follow-up.  His sister is present with him at the bedside today.    His asthma has been worsening recently.  He will go outside and get tired quickly start wheezing.  He is also having some allergy symptoms and is not currently on any antihistamine.  He has been compliant with Pulmicort, and albuterol nebulizer.  He has difficult time with inhalers and the timing of his inhalation with pushing the button of the inhaler.  He does not have a spacer.  He is not on anticholinergic      HPI    ROS:  Review of Systems   Respiratory: Positive for chest tightness, shortness of breath and wheezing.    Cardiovascular: Negative.    Gastrointestinal: Negative.    Allergic/Immunologic: Positive for environmental allergies.         Current Outpatient Medications:   •  albuterol (ACCUNEB) 1.25 MG/3ML nebulizer solution, Take 3 mL by nebulization Every 6 (Six) Hours As Needed for Wheezing., Disp: 100 each, Rfl: 3  •  albuterol sulfate  (90 Base) MCG/ACT inhaler, Inhale 2 puffs Every 4 (Four) Hours As Needed for Wheezing., Disp: 18 g, Rfl: 5  •  aspirin 81 MG EC tablet, Take 1 tablet by mouth Daily., Disp: 30 tablet, Rfl: 5  •  budesonide (PULMICORT) 0.5 MG/2ML nebulizer solution, Take 2 mL by nebulization 2 (two) times a day., Disp: 100 each, Rfl: 5  •  cholecalciferol (VITAMIN D3) 25 MCG (1000 UT) tablet, Take 1 tablet by mouth Daily., Disp: 30 tablet, Rfl: 5  •  hydroCHLOROthiazide (MICROZIDE) 12.5 MG capsule, Take 1 capsule by mouth Every Morning., Disp: 30 capsule, Rfl: 5  •  losartan (COZAAR) 50 MG tablet, Take 1 tablet by mouth Daily., Disp: 30 tablet, Rfl: 5  •  montelukast (SINGULAIR) 10 MG tablet, Take 1 tablet by mouth Every Night., Disp: 30 tablet, Rfl: 5  •  pantoprazole (PROTONIX) 40 MG EC tablet, Take 1 tablet by mouth  Daily., Disp: 30 tablet, Rfl: 5  •  simvastatin (ZOCOR) 20 MG tablet, Take 1 tablet by mouth Every Night., Disp: 30 tablet, Rfl: 5  •  tamsulosin (FLOMAX) 0.4 MG capsule 24 hr capsule, Take 1 capsule by mouth Daily., Disp: 30 capsule, Rfl: 5  •  vitamin C (ASCORBIC ACID) 500 MG tablet, Take 1 tablet by mouth Daily., Disp: 30 tablet, Rfl: 5  •  cetirizine (zyrTEC) 10 MG tablet, Take 1 tablet by mouth Daily., Disp: 30 tablet, Rfl: 11  •  tiotropium bromide monohydrate (Spiriva Respimat) 2.5 MCG/ACT aerosol solution inhaler, Inhale 2 puffs Daily., Disp: 1 each, Rfl: 5    Lab Results   Component Value Date    GLUCOSE 116 (H) 06/30/2020    BUN 20 06/30/2020    CREATININE 0.95 06/30/2020    EGFRIFAFRI 94 06/30/2020    BCR 21.1 06/30/2020    K 3.5 06/30/2020    CO2 22.3 06/30/2020    CALCIUM 10.0 06/30/2020    ALBUMIN 4.40 06/30/2020    AST 18 06/30/2020    ALT 22 06/30/2020       WBC   Date Value Ref Range Status   06/30/2020 7.55 3.40 - 10.80 10*3/mm3 Final     RBC   Date Value Ref Range Status   06/30/2020 4.67 4.14 - 5.80 10*6/mm3 Final     Hemoglobin   Date Value Ref Range Status   06/30/2020 15.1 13.0 - 17.7 g/dL Final     Hematocrit   Date Value Ref Range Status   06/30/2020 44.3 37.5 - 51.0 % Final     MCV   Date Value Ref Range Status   06/30/2020 94.9 79.0 - 97.0 fL Final     MCH   Date Value Ref Range Status   06/30/2020 32.3 26.6 - 33.0 pg Final     MCHC   Date Value Ref Range Status   06/30/2020 34.1 31.5 - 35.7 g/dL Final     RDW   Date Value Ref Range Status   06/30/2020 12.5 12.3 - 15.4 % Final     RDW-SD   Date Value Ref Range Status   06/30/2020 43.7 37.0 - 54.0 fl Final     MPV   Date Value Ref Range Status   06/30/2020 10.3 6.0 - 12.0 fL Final     Platelets   Date Value Ref Range Status   06/30/2020 251 140 - 450 10*3/mm3 Final     Neutrophil %   Date Value Ref Range Status   06/30/2020 69.5 42.7 - 76.0 % Final     Lymphocyte %   Date Value Ref Range Status   06/30/2020 15.9 (L) 19.6 - 45.3 % Final  "    Monocyte %   Date Value Ref Range Status   06/30/2020 12.1 (H) 5.0 - 12.0 % Final     Eosinophil %   Date Value Ref Range Status   06/30/2020 1.5 0.3 - 6.2 % Final     Basophil %   Date Value Ref Range Status   06/30/2020 0.7 0.0 - 1.5 % Final     Immature Grans %   Date Value Ref Range Status   06/30/2020 0.3 0.0 - 0.5 % Final     Neutrophils, Absolute   Date Value Ref Range Status   06/30/2020 5.26 1.70 - 7.00 10*3/mm3 Final     Lymphocytes, Absolute   Date Value Ref Range Status   06/30/2020 1.20 0.70 - 3.10 10*3/mm3 Final     Monocytes, Absolute   Date Value Ref Range Status   06/30/2020 0.91 (H) 0.10 - 0.90 10*3/mm3 Final     Eosinophils, Absolute   Date Value Ref Range Status   06/30/2020 0.11 0.00 - 0.40 10*3/mm3 Final     Basophils, Absolute   Date Value Ref Range Status   06/30/2020 0.05 0.00 - 0.20 10*3/mm3 Final     Immature Grans, Absolute   Date Value Ref Range Status   06/30/2020 0.02 0.00 - 0.05 10*3/mm3 Final     nRBC   Date Value Ref Range Status   06/30/2020 0.0 0.0 - 0.2 /100 WBC Final         OBJECTIVE:  Visit Vitals  /64 (BP Location: Right arm, Patient Position: Sitting, Cuff Size: Adult)   Pulse 76   Ht 162.6 cm (64\")   Wt 70.3 kg (155 lb)   SpO2 95%   BMI 26.61 kg/m²      Physical Exam  Vitals reviewed.   Constitutional:       General: He is not in acute distress.     Appearance: Normal appearance. He is well-developed.   HENT:      Head: Normocephalic and atraumatic.   Eyes:      Pupils: Pupils are equal, round, and reactive to light.   Neck:      Thyroid: No thyromegaly.      Trachea: Phonation normal.   Cardiovascular:      Rate and Rhythm: Rhythm regularly irregular.      Heart sounds: No murmur heard.     Pulmonary:      Effort: No respiratory distress.   Skin:     Coloration: Skin is not pale.      Findings: No erythema.   Neurological:      Mental Status: He is alert.   Psychiatric:         Behavior: Behavior normal.         Thought Content: Thought content normal.         " Judgment: Judgment normal.       EKG from December 21, 2021 was reviewed.  This was obtained for regularly irregular heartbeat and was unremarkable.  Patient without symptoms today.    Assessment/Plan    Diagnoses and all orders for this visit:    1. Moderate persistent asthma without complication (Primary)  -     tiotropium bromide monohydrate (Spiriva Respimat) 2.5 MCG/ACT aerosol solution inhaler; Inhale 2 puffs Daily.  Dispense: 1 each; Refill: 5    2. Allergic rhinitis, unspecified seasonality, unspecified trigger  -     cetirizine (zyrTEC) 10 MG tablet; Take 1 tablet by mouth Daily.  Dispense: 30 tablet; Refill: 11    3. Essential hypertension  -     CBC & Differential; Future    4. Hyperlipidemia, unspecified hyperlipidemia type  -     Lipid Panel; Future  -     Comprehensive Metabolic Panel; Future    5. Prediabetes  -     Hemoglobin A1c; Future    6. Vitamin D deficiency  -     Vitamin D 25 Hydroxy; Future    His asthma is worsening.  I like to try Spiriva Respimat.  Samples were given to him in the office and instructed on proper use of the medication.  Also start Zyrtec at night to help with his allergy symptoms as this may also help his asthma.    We will check the above labs as well since he is due for them    He has received both COVID-19 vaccines.    Follow-up 6 months for Medicare wellness or sooner if indicated    Return in about 6 months (around 12/25/2021) for Medicare Wellness.    Patient was given instructions and counseling regarding his/her condition or for health maintenance advice. Please see specific information pulled into the AVS if appropriate.     NAV Caputo MD   11:30 CDT  6/25/2021

## 2021-06-26 DIAGNOSIS — K21.9 GASTROESOPHAGEAL REFLUX DISEASE: ICD-10-CM

## 2021-06-30 RX ORDER — PANTOPRAZOLE SODIUM 40 MG/1
40 TABLET, DELAYED RELEASE ORAL DAILY
Qty: 30 TABLET | Refills: 5 | Status: SHIPPED | OUTPATIENT
Start: 2021-06-30 | End: 2021-12-28

## 2021-07-01 DIAGNOSIS — I10 ESSENTIAL HYPERTENSION: ICD-10-CM

## 2021-07-01 RX ORDER — HYDROCHLOROTHIAZIDE 12.5 MG/1
12.5 CAPSULE, GELATIN COATED ORAL EVERY MORNING
Qty: 30 CAPSULE | Refills: 5 | Status: SHIPPED | OUTPATIENT
Start: 2021-07-01 | End: 2021-12-28

## 2021-07-01 RX ORDER — LOSARTAN POTASSIUM 50 MG/1
50 TABLET ORAL DAILY
Qty: 30 TABLET | Refills: 5 | Status: SHIPPED | OUTPATIENT
Start: 2021-07-01 | End: 2021-12-28

## 2021-07-02 DIAGNOSIS — N40.0 BENIGN PROSTATIC HYPERPLASIA, UNSPECIFIED WHETHER LOWER URINARY TRACT SYMPTOMS PRESENT: ICD-10-CM

## 2021-07-02 RX ORDER — TAMSULOSIN HYDROCHLORIDE 0.4 MG/1
1 CAPSULE ORAL DAILY
Qty: 30 CAPSULE | Refills: 5 | Status: SHIPPED | OUTPATIENT
Start: 2021-07-02 | End: 2021-12-28

## 2021-08-02 DIAGNOSIS — E78.5 HYPERLIPIDEMIA, UNSPECIFIED HYPERLIPIDEMIA TYPE: ICD-10-CM

## 2021-08-02 RX ORDER — SIMVASTATIN 20 MG
20 TABLET ORAL NIGHTLY
Qty: 30 TABLET | Refills: 5 | Status: SHIPPED | OUTPATIENT
Start: 2021-08-02 | End: 2022-01-27

## 2021-12-27 ENCOUNTER — LAB (OUTPATIENT)
Dept: LAB | Facility: HOSPITAL | Age: 76
End: 2021-12-27

## 2021-12-27 ENCOUNTER — OFFICE VISIT (OUTPATIENT)
Dept: INTERNAL MEDICINE | Facility: CLINIC | Age: 76
End: 2021-12-27

## 2021-12-27 VITALS
OXYGEN SATURATION: 97 % | HEART RATE: 88 BPM | SYSTOLIC BLOOD PRESSURE: 126 MMHG | DIASTOLIC BLOOD PRESSURE: 68 MMHG | BODY MASS INDEX: 26.73 KG/M2 | WEIGHT: 156.6 LBS | HEIGHT: 64 IN

## 2021-12-27 DIAGNOSIS — R73.03 PREDIABETES: ICD-10-CM

## 2021-12-27 DIAGNOSIS — J45.40 MODERATE PERSISTENT ASTHMA WITHOUT COMPLICATION: ICD-10-CM

## 2021-12-27 DIAGNOSIS — E78.5 HYPERLIPIDEMIA, UNSPECIFIED HYPERLIPIDEMIA TYPE: ICD-10-CM

## 2021-12-27 DIAGNOSIS — K21.9 GASTROESOPHAGEAL REFLUX DISEASE, UNSPECIFIED WHETHER ESOPHAGITIS PRESENT: ICD-10-CM

## 2021-12-27 DIAGNOSIS — I10 ESSENTIAL HYPERTENSION: ICD-10-CM

## 2021-12-27 DIAGNOSIS — Z00.00 MEDICARE ANNUAL WELLNESS VISIT, SUBSEQUENT: Primary | ICD-10-CM

## 2021-12-27 DIAGNOSIS — E55.9 VITAMIN D DEFICIENCY: ICD-10-CM

## 2021-12-27 LAB
ALBUMIN SERPL-MCNC: 4.4 G/DL (ref 3.5–5.2)
ALBUMIN/GLOB SERPL: 1.4 G/DL
ALP SERPL-CCNC: 74 U/L (ref 39–117)
ALT SERPL W P-5'-P-CCNC: 19 U/L (ref 1–41)
ANION GAP SERPL CALCULATED.3IONS-SCNC: 9.3 MMOL/L (ref 5–15)
AST SERPL-CCNC: 19 U/L (ref 1–40)
BILIRUB SERPL-MCNC: 0.3 MG/DL (ref 0–1.2)
BUN SERPL-MCNC: 20 MG/DL (ref 8–23)
BUN/CREAT SERPL: 18.5 (ref 7–25)
CALCIUM SPEC-SCNC: 10 MG/DL (ref 8.6–10.5)
CHLORIDE SERPL-SCNC: 100 MMOL/L (ref 98–107)
CO2 SERPL-SCNC: 27.7 MMOL/L (ref 22–29)
CREAT SERPL-MCNC: 1.08 MG/DL (ref 0.76–1.27)
GFR SERPL CREATININE-BSD FRML MDRD: 80 ML/MIN/1.73
GLOBULIN UR ELPH-MCNC: 3.1 GM/DL
GLUCOSE SERPL-MCNC: 95 MG/DL (ref 65–99)
HBA1C MFR BLD: 6.5 % (ref 4.8–5.6)
POTASSIUM SERPL-SCNC: 3.5 MMOL/L (ref 3.5–5.2)
PROT SERPL-MCNC: 7.5 G/DL (ref 6–8.5)
SODIUM SERPL-SCNC: 137 MMOL/L (ref 136–145)

## 2021-12-27 PROCEDURE — 83036 HEMOGLOBIN GLYCOSYLATED A1C: CPT

## 2021-12-27 PROCEDURE — 1126F AMNT PAIN NOTED NONE PRSNT: CPT | Performed by: NURSE PRACTITIONER

## 2021-12-27 PROCEDURE — 1170F FXNL STATUS ASSESSED: CPT | Performed by: NURSE PRACTITIONER

## 2021-12-27 PROCEDURE — G0439 PPPS, SUBSEQ VISIT: HCPCS | Performed by: NURSE PRACTITIONER

## 2021-12-27 PROCEDURE — 1159F MED LIST DOCD IN RCRD: CPT | Performed by: NURSE PRACTITIONER

## 2021-12-27 PROCEDURE — 36415 COLL VENOUS BLD VENIPUNCTURE: CPT

## 2021-12-27 PROCEDURE — 80053 COMPREHEN METABOLIC PANEL: CPT

## 2021-12-27 NOTE — PROGRESS NOTES
The ABCs of the Annual Wellness Visit  Subsequent Medicare Wellness Visit    Chief Complaint   Patient presents with   • Follow-up      Subjective    History of Present Illness:  Abhishek Mendieta is a 76 y.o. male who presents for a Subsequent Medicare Wellness Visit.  No complaints today.    The following portions of the patient's history were reviewed and   updated as appropriate: allergies, current medications, past family history, past medical history, past social history, past surgical history and problem list.    Compared to one year ago, the patient feels his physical   health is the same.    Compared to one year ago, the patient feels his mental   health is the same.    Recent Hospitalizations:  He was not admitted to the hospital during the last year.       Current Medical Providers:  Patient Care Team:  DENNIS Caputo MD as PCP - General (Internal Medicine)    Outpatient Medications Prior to Visit   Medication Sig Dispense Refill   • albuterol (ACCUNEB) 1.25 MG/3ML nebulizer solution Take 3 mL by nebulization Every 6 (Six) Hours As Needed for Wheezing. 100 each 3   • albuterol sulfate  (90 Base) MCG/ACT inhaler Inhale 2 puffs Every 4 (Four) Hours As Needed for Wheezing. 18 g 5   • aspirin 81 MG EC tablet Take 1 tablet by mouth Daily. 30 tablet 5   • budesonide (PULMICORT) 0.5 MG/2ML nebulizer solution Take 2 mL by nebulization 2 (two) times a day. 100 each 5   • cetirizine (zyrTEC) 10 MG tablet Take 1 tablet by mouth Daily. 30 tablet 11   • cholecalciferol (VITAMIN D3) 25 MCG (1000 UT) tablet Take 1 tablet by mouth Daily. 30 tablet 5   • hydroCHLOROthiazide (MICROZIDE) 12.5 MG capsule TAKE 1 CAPSULE BY MOUTH EVERY MORNING. 30 capsule 5   • losartan (COZAAR) 50 MG tablet TAKE 1 TABLET BY MOUTH DAILY. 30 tablet 5   • montelukast (SINGULAIR) 10 MG tablet Take 1 tablet by mouth Every Night. 30 tablet 5   • pantoprazole (PROTONIX) 40 MG EC tablet TAKE 1 TABLET BY MOUTH DAILY. 30 tablet 5   • simvastatin  "(ZOCOR) 20 MG tablet Take 1 tablet by mouth Every Night. 30 tablet 5   • tamsulosin (FLOMAX) 0.4 MG capsule 24 hr capsule TAKE 1 CAPSULE BY MOUTH DAILY. 30 capsule 5   • tiotropium bromide monohydrate (Spiriva Respimat) 2.5 MCG/ACT aerosol solution inhaler Inhale 2 puffs Daily. 1 each 5   • vitamin C (ASCORBIC ACID) 500 MG tablet Take 1 tablet by mouth Daily. 30 tablet 5     No facility-administered medications prior to visit.       No opioid medication identified on active medication list. I have reviewed chart for other potential  high risk medication/s and harmful drug interactions in the elderly.          Aspirin is on active medication list. Aspirin use is indicated based on review of current medical condition/s. Pros and cons of this therapy have been discussed today. Benefits of this medication outweigh potential harm.  Patient has been encouraged to continue taking this medication.  .      Patient Active Problem List   Diagnosis   • Prediabetes   • Essential hypertension   • Hyperlipidemia   • Gastroesophageal reflux disease   • Benign prostatic hyperplasia   • Moderate persistent asthma without complication   • Vitamin D deficiency     Advance Care Planning  Advance Directive is not on file.  ACP discussion was held with the patient during this visit. Patient does not have an advance directive, information provided.          Objective    Vitals:    12/27/21 1108   BP: 126/68   BP Location: Right arm   Patient Position: Sitting   Cuff Size: Adult   Pulse: 88   SpO2: 97%   Weight: 71 kg (156 lb 9.6 oz)   Height: 162.6 cm (64\")     BMI Readings from Last 1 Encounters:   12/27/21 26.88 kg/m²   BMI is above normal parameters. Recommendations include: exercise counseling and nutrition counseling    Does the patient have evidence of cognitive impairment? Yes    Physical Exam  Vitals and nursing note reviewed.   Constitutional:       General: He is not in acute distress.     Appearance: Normal appearance. He is not " ill-appearing, toxic-appearing or diaphoretic.   HENT:      Head: Normocephalic and atraumatic.      Right Ear: Tympanic membrane, ear canal and external ear normal. There is no impacted cerumen.      Left Ear: Tympanic membrane, ear canal and external ear normal. There is no impacted cerumen.      Nose: Congestion present.      Mouth/Throat:      Mouth: Mucous membranes are moist.      Pharynx: Oropharynx is clear.   Eyes:      General: No scleral icterus.        Right eye: No discharge.         Left eye: No discharge.      Conjunctiva/sclera: Conjunctivae normal.      Pupils: Pupils are equal, round, and reactive to light.   Neck:      Vascular: No carotid bruit.   Cardiovascular:      Rate and Rhythm: Normal rate and regular rhythm.      Heart sounds: Normal heart sounds. No murmur heard.       Comments: Regular rhythm with skipped beats regularly.  Pulmonary:      Effort: Pulmonary effort is normal. No respiratory distress.      Breath sounds: Normal breath sounds. No stridor. No wheezing, rhonchi or rales.   Abdominal:      General: There is no distension.      Palpations: Abdomen is soft. There is no mass.      Tenderness: There is no abdominal tenderness.      Hernia: No hernia is present.   Musculoskeletal:      Cervical back: Normal range of motion and neck supple. No rigidity or tenderness.      Right lower leg: No edema.      Left lower leg: No edema.   Lymphadenopathy:      Cervical: No cervical adenopathy.   Skin:     General: Skin is warm and dry.      Coloration: Skin is not jaundiced or pale.      Findings: No bruising, erythema, lesion or rash.   Neurological:      General: No focal deficit present.      Mental Status: He is alert.   Psychiatric:         Mood and Affect: Mood normal.         Behavior: Behavior normal.      Comments: Very pleasant                 HEALTH RISK ASSESSMENT    Smoking Status:  Social History     Tobacco Use   Smoking Status Never Smoker   Smokeless Tobacco Never Used      Alcohol Consumption:  Social History     Substance and Sexual Activity   Alcohol Use Never     Fall Risk Screen:    OMEROADI Fall Risk Assessment has not been completed.    Depression Screening:  No flowsheet data found.    Health Habits and Functional and Cognitive Screening:  Functional & Cognitive Status 12/18/2020   Do you have difficulty preparing food and eating? No   Do you have difficulty bathing yourself, getting dressed or grooming yourself? No   Do you have difficulty using the toilet? No   Do you have difficulty moving around from place to place? No   Do you have trouble with steps or getting out of a bed or a chair? No   Current Diet Well Balanced Diet   Dental Exam Not up to date   Eye Exam Not up to date   Exercise (times per week) 0 times per week   Current Exercise Activities Include None   Do you need help using the phone?  Yes   Are you deaf or do you have serious difficulty hearing?  Yes   Do you need help with transportation? No   Do you need help shopping? Yes   Do you need help preparing meals?  Yes   Do you need help with housework?  Yes   Do you need help with laundry? Yes   Do you need help taking your medications? Yes   Do you need help managing money? Yes   Do you ever drive or ride in a car without wearing a seat belt? No   Have you felt unusual stress, anger or loneliness in the last month? No   Who do you live with? Sibling   If you need help, do you have trouble finding someone available to you? No   Have you been bothered in the last four weeks by sexual problems? No   Do you have difficulty concentrating, remembering or making decisions? Yes       Age-appropriate Screening Schedule:  Refer to the list below for future screening recommendations based on patient's age, sex and/or medical conditions. Orders for these recommended tests are listed in the plan section. The patient has been provided with a written plan.    Health Maintenance   Topic Date Due   • TDAP/TD VACCINES (1 -  Tdap) Never done   • ZOSTER VACCINE (1 of 2) Never done   • LIPID PANEL  06/25/2022   • INFLUENZA VACCINE  Completed              Assessment/Plan   CMS Preventative Services Quick Reference  Risk Factors Identified During Encounter  None Identified  The above risks/problems have been discussed with the patient.  Follow up actions/plans if indicated are seen below in the Assessment/Plan Section.  Pertinent information has been shared with the patient in the After Visit Summary.    Diagnoses and all orders for this visit:    1. Medicare annual wellness visit, subsequent (Primary)    2. Essential hypertension  -     Basic metabolic panel; Future    3. Hyperlipidemia, unspecified hyperlipidemia type    4. Prediabetes  -     Hemoglobin A1c; Future    5. Gastroesophageal reflux disease, unspecified whether esophagitis present    6. Vitamin D deficiency    7. Moderate persistent asthma without complication        Follow Up:   Return in about 6 months (around 6/27/2022) for follow up with Dr. Caputo.     An After Visit Summary and PPPS were made available to the patient.    I reviewed his labs from June of this year.    Blood pressure is at goal, continue the same medication regimen.        I spent 24 minutes caring for Abhishek on this date of service. This time includes time spent by me in the following activities:reviewing tests, performing a medically appropriate examination and/or evaluation , counseling and educating the patient/family/caregiver, ordering medications, tests, or procedures and documenting information in the medical record.         Nya Germain, APRN  12/27/21

## 2021-12-27 NOTE — PATIENT INSTRUCTIONS
Medicare Wellness  Personal Prevention Plan of Service     Date of Office Visit:  2021  Encounter Provider:  BRETT Dobson  Place of Service:  [facility] INTERNAL MEDICINE  Patient Name: Abhishek Mendieta  :  1945    As part of the Medicare Wellness portion of your visit today, we are providing you with this personalized preventive plan of services (PPPS). This plan is based upon recommendations of the [place] Preventive Services Task Force (USPSTF) and the Advisory Committee on Immunization Practices (ACIP).    This lists the preventive care services that should be considered, and provides dates of when you are due. Items listed as completed are up-to-date and do not require any further intervention.    Health Maintenance   Topic Date Due   • TDAP/TD VACCINES (1 - Tdap) Never done   • ZOSTER VACCINE (1 of 2) Never done   • COVID-19 Vaccine (2 - Moderna 3-dose series) 2021   • ANNUAL WELLNESS VISIT  2021   • LIPID PANEL  2022   • HEPATITIS C SCREENING  Completed   • INFLUENZA VACCINE  Completed   • Pneumococcal Vaccine 65+  Completed       No orders of the defined types were placed in this encounter.      Return in about 6 months (around 2022) for follow up with Dr. Caputo.          Advance Care Planning and Advance Directives     You make decisions on a daily basis - decisions about where you want to live, your career, your home, your life. Perhaps one of the most important decisions you face is your choice for future medical care. Take time to talk with your family and your healthcare team and start planning today.  Advance Care Planning is a process that can help you:  · Understand possible future healthcare decisions in light of your own experiences  · Reflect on those decision in light of your goals and values  · Discuss your decisions with those closest to you and the healthcare professionals that care for you  · Make a plan by creating a  document that reflects your wishes    Surrogate Decision Maker  In the event of a medical emergency, which has left you unable to communicate or to make your own decisions, you would need someone to make decisions for you.  It is important to discuss your preferences for medical treatment with this person while you are in good health.     Qualities of a surrogate decision maker:  • Willing to take on this role and responsibility  • Knows what you want for future medical care  • Willing to follow your wishes even if they don't agree with them  • Able to make difficult medical decisions under stressful circumstances    Advance Directives  These are legal documents you can create that will guide your healthcare team and decision maker(s) when needed. These documents can be stored in the electronic medical record.    · Living Will - a legal document to guide your care if you have a terminal condition or a serious illness and are unable to communicate. States vary by statute in document names/types, but most forms may include one or more of the following:        -  Directions regarding life-prolonging treatments        -  Directions regarding artificially provided nutrition/hydration        -  Choosing a healthcare decision maker        -  Direction regarding organ/tissue donation    · Durable Power of  for Healthcare - this document names an -in-fact to make medical decisions for you, but it may also allow this person to make personal and financial decisions for you. Please seek the advice of an  if you need this type of document.    **Advance Directives are not required and no one may discriminate against you if you do not sign one.    Medical Orders  Many states allow specific forms/orders signed by your physician to record your wishes for medical treatment in your current state of health. This form, signed in personal communication with your physician, addresses resuscitation and other medical  interventions that you may or may not want.      For more information or to schedule a time with a [facility] Advance Care Planning Facilitator contact: [facility]/ACP or call [phone] and someone will contact you directly.

## 2021-12-28 DIAGNOSIS — I10 ESSENTIAL HYPERTENSION: ICD-10-CM

## 2021-12-28 DIAGNOSIS — N40.0 BENIGN PROSTATIC HYPERPLASIA, UNSPECIFIED WHETHER LOWER URINARY TRACT SYMPTOMS PRESENT: ICD-10-CM

## 2021-12-28 DIAGNOSIS — K21.9 GASTROESOPHAGEAL REFLUX DISEASE: ICD-10-CM

## 2021-12-28 RX ORDER — PANTOPRAZOLE SODIUM 40 MG/1
40 TABLET, DELAYED RELEASE ORAL DAILY
Qty: 30 TABLET | Refills: 5 | Status: SHIPPED | OUTPATIENT
Start: 2021-12-28 | End: 2022-06-22

## 2021-12-28 RX ORDER — LOSARTAN POTASSIUM 50 MG/1
50 TABLET ORAL DAILY
Qty: 30 TABLET | Refills: 5 | Status: SHIPPED | OUTPATIENT
Start: 2021-12-28 | End: 2022-06-22

## 2021-12-28 RX ORDER — TAMSULOSIN HYDROCHLORIDE 0.4 MG/1
CAPSULE ORAL
Qty: 30 CAPSULE | Refills: 5 | Status: SHIPPED | OUTPATIENT
Start: 2021-12-28 | End: 2022-06-22

## 2021-12-28 RX ORDER — HYDROCHLOROTHIAZIDE 12.5 MG/1
12.5 CAPSULE, GELATIN COATED ORAL EVERY MORNING
Qty: 30 CAPSULE | Refills: 5 | Status: SHIPPED | OUTPATIENT
Start: 2021-12-28 | End: 2022-06-22

## 2022-01-27 DIAGNOSIS — E78.5 HYPERLIPIDEMIA, UNSPECIFIED HYPERLIPIDEMIA TYPE: ICD-10-CM

## 2022-01-27 RX ORDER — SIMVASTATIN 20 MG
20 TABLET ORAL NIGHTLY
Qty: 30 TABLET | Refills: 5 | Status: SHIPPED | OUTPATIENT
Start: 2022-01-27 | End: 2022-06-28 | Stop reason: SDUPTHER

## 2022-03-07 DIAGNOSIS — J45.40 MODERATE PERSISTENT ASTHMA WITHOUT COMPLICATION: ICD-10-CM

## 2022-03-07 RX ORDER — BUDESONIDE 0.5 MG/2ML
0.5 INHALANT ORAL
Qty: 60 EACH | Refills: 11 | Status: SHIPPED | OUTPATIENT
Start: 2022-03-07

## 2022-03-07 RX ORDER — IPRATROPIUM BROMIDE AND ALBUTEROL SULFATE 2.5; .5 MG/3ML; MG/3ML
SOLUTION RESPIRATORY (INHALATION)
Qty: 360 ML | Refills: 11 | Status: SHIPPED | OUTPATIENT
Start: 2022-03-07

## 2022-03-10 DIAGNOSIS — J45.40 MODERATE PERSISTENT ASTHMA WITHOUT COMPLICATION: ICD-10-CM

## 2022-03-10 RX ORDER — BUDESONIDE 0.5 MG/2ML
INHALANT ORAL
Refills: 11 | OUTPATIENT
Start: 2022-03-10

## 2022-04-29 ENCOUNTER — TELEPHONE (OUTPATIENT)
Dept: INTERNAL MEDICINE | Facility: CLINIC | Age: 77
End: 2022-04-29

## 2022-04-29 NOTE — TELEPHONE ENCOUNTER
PATIENTS SISTER HAS BEEN CALLED, SHE STATED THAT SHE WILL GET THE FORM AND BRING IT TO THE OFFICE FOR DR GARAY TO FILL OUT.

## 2022-04-29 NOTE — TELEPHONE ENCOUNTER
"SPOKE WITH PATIENT, SHE STATED THAT SHE WAS TOLD FROM THE [facility]  THAT SHE NEEDED AN ASSESSMENT FROM A HEALTH CARE PROVIDER AND DR GARAY  WOULD KNOW WHAT TO DO.   WHEN I TRIED TO OFFER HER AN APPOINTMENT SHE STATED \"I DONT NEED NO APPOINTMENT\".  SHE STATED THAT THERE WAS A FORM FOR DR GARAY TO FILL OUT AND I EXPLAINED THAT SHE WOULD NEED TO BRING HIM THE FORM AND SHE STATED SHE WAS TOLD THAT HE WOULD KNOW WHAT TO DO.     PLEASE ADVISE.     SHE STATED THAT SHE IS TAKING CARE OF HER BROTHER AND IS WANTING TO GET PAID FOR IT SINCE SHE IS TAKING CARE OF HIM ANYWAY.   "

## 2022-04-29 NOTE — TELEPHONE ENCOUNTER
Would you mind getting the form? I can look at it and determine whether she needs an appointment or not.  Thanks

## 2022-04-29 NOTE — TELEPHONE ENCOUNTER
Caller: WILL GOEL    Relationship: Emergency Contact    Best call back number: [phone]    What form or medical record are you requesting: CAREGIVER ASSESSMENT     Who is requesting this form or medical record from you: WILL GOEL    How would you like to receive the form or medical records (pick-up, mail, fax): N/A      Timeframe paperwork needed: ASAP    Additional notes: THE PATIENT'S SISTER CALLED AND IS BECOMING A CAREGIVER FOR [facility]O. THE PATIENT'S SISTER STATED THAT SHE NEEDED AN ASSESSMENT BY A HEALTH CARE PROVIDER, BUT DID NOT HAVE ANY FURTHER INFORMATION.

## 2022-05-03 ENCOUNTER — TELEPHONE (OUTPATIENT)
Dept: INTERNAL MEDICINE | Facility: CLINIC | Age: 77
End: 2022-05-03

## 2022-05-03 DIAGNOSIS — R41.89 COGNITIVE IMPAIRMENT: Primary | ICD-10-CM

## 2022-05-03 NOTE — TELEPHONE ENCOUNTER
According to the community based lady that called regarding the patients sister becoming a caregiver and being paid for it. They stated that they do not have any types of forms. They said that we would need to place a home health referral and they would be able to come and evaluate the patient.

## 2022-05-05 ENCOUNTER — HOME HEALTH ADMISSION (OUTPATIENT)
Dept: HOME HEALTH SERVICES | Facility: HOME HEALTHCARE | Age: 77
End: 2022-05-05

## 2022-05-10 ENCOUNTER — TELEPHONE (OUTPATIENT)
Dept: INTERNAL MEDICINE | Facility: CLINIC | Age: 77
End: 2022-05-10

## 2022-05-10 NOTE — TELEPHONE ENCOUNTER
Caller: WILL GOEL    Relationship: Emergency Contact    Best call back number: [phone]      Who are you requesting to speak with     CLINICAL STAFF    Do you know the name of the person who called: WILL    What was the call regarding:     PLEASE GIVE HER A CALL

## 2022-05-10 NOTE — TELEPHONE ENCOUNTER
Spoke with patients sister and she was asking about the referral that she asked us to place. Home health will not accept referral until there is a Face to face visit completed.

## 2022-06-22 DIAGNOSIS — I10 ESSENTIAL HYPERTENSION: ICD-10-CM

## 2022-06-22 DIAGNOSIS — K21.9 GASTROESOPHAGEAL REFLUX DISEASE: ICD-10-CM

## 2022-06-22 DIAGNOSIS — N40.0 BENIGN PROSTATIC HYPERPLASIA, UNSPECIFIED WHETHER LOWER URINARY TRACT SYMPTOMS PRESENT: ICD-10-CM

## 2022-06-22 RX ORDER — LOSARTAN POTASSIUM 50 MG/1
50 TABLET ORAL DAILY
Qty: 30 TABLET | Refills: 5 | Status: SHIPPED | OUTPATIENT
Start: 2022-06-22 | End: 2022-06-28 | Stop reason: SDUPTHER

## 2022-06-22 RX ORDER — PANTOPRAZOLE SODIUM 40 MG/1
40 TABLET, DELAYED RELEASE ORAL DAILY
Qty: 30 TABLET | Refills: 5 | Status: SHIPPED | OUTPATIENT
Start: 2022-06-22 | End: 2022-06-28 | Stop reason: SDUPTHER

## 2022-06-22 RX ORDER — HYDROCHLOROTHIAZIDE 12.5 MG/1
12.5 CAPSULE, GELATIN COATED ORAL EVERY MORNING
Qty: 30 CAPSULE | Refills: 5 | Status: SHIPPED | OUTPATIENT
Start: 2022-06-22 | End: 2022-06-28 | Stop reason: SDUPTHER

## 2022-06-22 RX ORDER — TAMSULOSIN HYDROCHLORIDE 0.4 MG/1
1 CAPSULE ORAL DAILY
Qty: 30 CAPSULE | Refills: 5 | Status: SHIPPED | OUTPATIENT
Start: 2022-06-22 | End: 2022-06-28 | Stop reason: SDUPTHER

## 2022-06-28 ENCOUNTER — LAB (OUTPATIENT)
Dept: LAB | Facility: HOSPITAL | Age: 77
End: 2022-06-28

## 2022-06-28 ENCOUNTER — OFFICE VISIT (OUTPATIENT)
Dept: INTERNAL MEDICINE | Facility: CLINIC | Age: 77
End: 2022-06-28

## 2022-06-28 ENCOUNTER — HOME HEALTH ADMISSION (OUTPATIENT)
Dept: HOME HEALTH SERVICES | Facility: HOME HEALTHCARE | Age: 77
End: 2022-06-28

## 2022-06-28 VITALS
BODY MASS INDEX: 25.83 KG/M2 | HEIGHT: 64 IN | HEART RATE: 79 BPM | WEIGHT: 151.3 LBS | RESPIRATION RATE: 15 BRPM | DIASTOLIC BLOOD PRESSURE: 58 MMHG | OXYGEN SATURATION: 97 % | TEMPERATURE: 98.1 F | SYSTOLIC BLOOD PRESSURE: 100 MMHG

## 2022-06-28 DIAGNOSIS — N40.0 BENIGN PROSTATIC HYPERPLASIA, UNSPECIFIED WHETHER LOWER URINARY TRACT SYMPTOMS PRESENT: ICD-10-CM

## 2022-06-28 DIAGNOSIS — E78.5 HYPERLIPIDEMIA, UNSPECIFIED HYPERLIPIDEMIA TYPE: ICD-10-CM

## 2022-06-28 DIAGNOSIS — I10 ESSENTIAL HYPERTENSION: Primary | ICD-10-CM

## 2022-06-28 DIAGNOSIS — K21.9 GASTROESOPHAGEAL REFLUX DISEASE: ICD-10-CM

## 2022-06-28 DIAGNOSIS — R73.03 PREDIABETES: ICD-10-CM

## 2022-06-28 DIAGNOSIS — R41.89 COGNITIVE IMPAIRMENT: ICD-10-CM

## 2022-06-28 DIAGNOSIS — J45.40 MODERATE PERSISTENT ASTHMA WITHOUT COMPLICATION: ICD-10-CM

## 2022-06-28 DIAGNOSIS — I10 ESSENTIAL HYPERTENSION: ICD-10-CM

## 2022-06-28 LAB
ALBUMIN SERPL-MCNC: 4.3 G/DL (ref 3.5–5.2)
ALBUMIN/GLOB SERPL: 1.4 G/DL
ALP SERPL-CCNC: 81 U/L (ref 39–117)
ALT SERPL W P-5'-P-CCNC: 18 U/L (ref 1–41)
ANION GAP SERPL CALCULATED.3IONS-SCNC: 10 MMOL/L (ref 5–15)
AST SERPL-CCNC: 20 U/L (ref 1–40)
BILIRUB SERPL-MCNC: 0.2 MG/DL (ref 0–1.2)
BUN SERPL-MCNC: 18 MG/DL (ref 8–23)
BUN/CREAT SERPL: 19.8 (ref 7–25)
CALCIUM SPEC-SCNC: 9.7 MG/DL (ref 8.6–10.5)
CHLORIDE SERPL-SCNC: 102 MMOL/L (ref 98–107)
CHOLEST SERPL-MCNC: 166 MG/DL (ref 0–200)
CO2 SERPL-SCNC: 26 MMOL/L (ref 22–29)
CREAT SERPL-MCNC: 0.91 MG/DL (ref 0.76–1.27)
EGFRCR SERPLBLD CKD-EPI 2021: 86.8 ML/MIN/1.73
GLOBULIN UR ELPH-MCNC: 3.1 GM/DL
GLUCOSE SERPL-MCNC: 103 MG/DL (ref 65–99)
HBA1C MFR BLD: 6.2 % (ref 4.8–5.6)
HDLC SERPL-MCNC: 53 MG/DL (ref 40–60)
LDLC SERPL CALC-MCNC: 84 MG/DL (ref 0–100)
LDLC/HDLC SERPL: 1.49 {RATIO}
POTASSIUM SERPL-SCNC: 3.4 MMOL/L (ref 3.5–5.2)
PROT SERPL-MCNC: 7.4 G/DL (ref 6–8.5)
SODIUM SERPL-SCNC: 138 MMOL/L (ref 136–145)
TRIGL SERPL-MCNC: 169 MG/DL (ref 0–150)
VLDLC SERPL-MCNC: 29 MG/DL (ref 5–40)

## 2022-06-28 PROCEDURE — 99214 OFFICE O/P EST MOD 30 MIN: CPT | Performed by: INTERNAL MEDICINE

## 2022-06-28 PROCEDURE — 80061 LIPID PANEL: CPT

## 2022-06-28 PROCEDURE — 80053 COMPREHEN METABOLIC PANEL: CPT

## 2022-06-28 PROCEDURE — 36415 COLL VENOUS BLD VENIPUNCTURE: CPT

## 2022-06-28 PROCEDURE — 83036 HEMOGLOBIN GLYCOSYLATED A1C: CPT

## 2022-06-28 RX ORDER — LOSARTAN POTASSIUM 50 MG/1
50 TABLET ORAL DAILY
Qty: 30 TABLET | Refills: 5 | Status: SHIPPED | OUTPATIENT
Start: 2022-06-28

## 2022-06-28 RX ORDER — POTASSIUM CHLORIDE 20 MEQ/1
20 TABLET, EXTENDED RELEASE ORAL DAILY
Qty: 7 TABLET | Refills: 0 | Status: SHIPPED | OUTPATIENT
Start: 2022-06-28 | End: 2022-07-05

## 2022-06-28 RX ORDER — SIMVASTATIN 20 MG
20 TABLET ORAL NIGHTLY
Qty: 30 TABLET | Refills: 5 | Status: SHIPPED | OUTPATIENT
Start: 2022-06-28 | End: 2023-02-01

## 2022-06-28 RX ORDER — TAMSULOSIN HYDROCHLORIDE 0.4 MG/1
1 CAPSULE ORAL DAILY
Qty: 30 CAPSULE | Refills: 5 | Status: SHIPPED | OUTPATIENT
Start: 2022-06-28

## 2022-06-28 RX ORDER — HYDROCHLOROTHIAZIDE 12.5 MG/1
12.5 CAPSULE, GELATIN COATED ORAL EVERY MORNING
Qty: 30 CAPSULE | Refills: 5 | Status: SHIPPED | OUTPATIENT
Start: 2022-06-28

## 2022-06-28 RX ORDER — PANTOPRAZOLE SODIUM 40 MG/1
40 TABLET, DELAYED RELEASE ORAL DAILY
Qty: 30 TABLET | Refills: 5 | Status: SHIPPED | OUTPATIENT
Start: 2022-06-28

## 2022-06-28 NOTE — PROGRESS NOTES
Chief Complaint   Patient presents with   • Follow-up   • Hypertension         History:  Abhishek Mendieta is a 76 y.o. male who presents today for evaluation of the above problems.      6 month follow up    He needs a referral to home health for community resources.  His sister has been taking care of him over the last 15 years and need social work to come out and assess him.  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  His sister is his power of  and he lives with her.    He denies any complaints at this time    HPI      ROS:  Review of Systems    As above    Current Outpatient Medications:   •  albuterol (ACCUNEB) 1.25 MG/3ML nebulizer solution, Take 3 mL by nebulization Every 6 (Six) Hours As Needed for Wheezing., Disp: 100 each, Rfl: 3  •  albuterol sulfate  (90 Base) MCG/ACT inhaler, Inhale 2 puffs Every 4 (Four) Hours As Needed for Wheezing., Disp: 18 g, Rfl: 5  •  aspirin 81 MG EC tablet, Take 1 tablet by mouth Daily., Disp: 30 tablet, Rfl: 5  •  budesonide (PULMICORT) 0.5 MG/2ML nebulizer solution, Take 2 mL by nebulization Daily., Disp: 60 each, Rfl: 11  •  cetirizine (zyrTEC) 10 MG tablet, Take 1 tablet by mouth Daily., Disp: 30 tablet, Rfl: 11  •  cholecalciferol (VITAMIN D3) 25 MCG (1000 UT) tablet, Take 1 tablet by mouth Daily., Disp: 30 tablet, Rfl: 5  •  hydroCHLOROthiazide (MICROZIDE) 12.5 MG capsule, Take 1 capsule by mouth Every Morning., Disp: 30 capsule, Rfl: 5  •  ipratropium-albuterol (DUO-NEB) 0.5-2.5 mg/3 ml nebulizer, USE 1 VIAL IN NEBULIZER EVERY 6 HOURS, Disp: 360 mL, Rfl: 11  •  losartan (COZAAR) 50 MG tablet, Take 1 tablet by mouth Daily., Disp: 30 tablet, Rfl: 5  •  montelukast (SINGULAIR) 10 MG tablet, Take 1 tablet by mouth Every Night., Disp: 30 tablet, Rfl: 5  •  pantoprazole (PROTONIX) 40 MG EC  tablet, Take 1 tablet by mouth Daily., Disp: 30 tablet, Rfl: 5  •  simvastatin (ZOCOR) 20 MG tablet, Take 1 tablet by mouth Every Night., Disp: 30 tablet, Rfl: 5  •  tamsulosin (FLOMAX) 0.4 MG capsule 24 hr capsule, Take 1 capsule by mouth Daily., Disp: 30 capsule, Rfl: 5  •  tiotropium bromide monohydrate (Spiriva Respimat) 2.5 MCG/ACT aerosol solution inhaler, Inhale 2 puffs Daily., Disp: 1 each, Rfl: 5  •  vitamin C (ASCORBIC ACID) 500 MG tablet, Take 1 tablet by mouth Daily., Disp: 30 tablet, Rfl: 5    Lab Results   Component Value Date    GLUCOSE 103 (H) 06/28/2022    BUN 18 06/28/2022    CREATININE 0.91 06/28/2022    EGFRIFAFRI 80 12/27/2021    BCR 19.8 06/28/2022    K 3.4 (L) 06/28/2022    CO2 26.0 06/28/2022    CALCIUM 9.7 06/28/2022    ALBUMIN 4.30 06/28/2022    AST 20 06/28/2022    ALT 18 06/28/2022       WBC   Date Value Ref Range Status   06/25/2021 7.29 3.40 - 10.80 10*3/mm3 Final     RBC   Date Value Ref Range Status   06/25/2021 4.57 4.14 - 5.80 10*6/mm3 Final     Hemoglobin   Date Value Ref Range Status   06/25/2021 14.5 13.0 - 17.7 g/dL Final     Hematocrit   Date Value Ref Range Status   06/25/2021 42.9 37.5 - 51.0 % Final     MCV   Date Value Ref Range Status   06/25/2021 93.9 79.0 - 97.0 fL Final     MCH   Date Value Ref Range Status   06/25/2021 31.7 26.6 - 33.0 pg Final     MCHC   Date Value Ref Range Status   06/25/2021 33.8 31.5 - 35.7 g/dL Final     RDW   Date Value Ref Range Status   06/25/2021 12.2 (L) 12.3 - 15.4 % Final     RDW-SD   Date Value Ref Range Status   06/25/2021 41.7 37.0 - 54.0 fl Final     MPV   Date Value Ref Range Status   06/25/2021 10.9 6.0 - 12.0 fL Final     Platelets   Date Value Ref Range Status   06/25/2021 256 140 - 450 10*3/mm3 Final     Neutrophil %   Date Value Ref Range Status   06/25/2021 66.6 42.7 - 76.0 % Final     Lymphocyte %   Date Value Ref Range Status   06/25/2021 21.1 19.6 - 45.3 % Final     Monocyte %   Date Value Ref Range Status   06/25/2021 8.6  "5.0 - 12.0 % Final     Eosinophil %   Date Value Ref Range Status   06/25/2021 2.5 0.3 - 6.2 % Final     Basophil %   Date Value Ref Range Status   06/25/2021 0.8 0.0 - 1.5 % Final     Immature Grans %   Date Value Ref Range Status   06/25/2021 0.4 0.0 - 0.5 % Final     Neutrophils, Absolute   Date Value Ref Range Status   06/25/2021 4.85 1.70 - 7.00 10*3/mm3 Final     Lymphocytes, Absolute   Date Value Ref Range Status   06/25/2021 1.54 0.70 - 3.10 10*3/mm3 Final     Monocytes, Absolute   Date Value Ref Range Status   06/25/2021 0.63 0.10 - 0.90 10*3/mm3 Final     Eosinophils, Absolute   Date Value Ref Range Status   06/25/2021 0.18 0.00 - 0.40 10*3/mm3 Final     Basophils, Absolute   Date Value Ref Range Status   06/25/2021 0.06 0.00 - 0.20 10*3/mm3 Final     Immature Grans, Absolute   Date Value Ref Range Status   06/25/2021 0.03 0.00 - 0.05 10*3/mm3 Final     nRBC   Date Value Ref Range Status   06/25/2021 0.0 0.0 - 0.2 /100 WBC Final         OBJECTIVE:  Visit Vitals  /58 (BP Location: Right arm, Patient Position: Sitting, Cuff Size: Adult)   Pulse 79   Temp 98.1 °F (36.7 °C) (Oral)   Resp 15   Ht 162.6 cm (64.02\")   Wt 68.6 kg (151 lb 4.8 oz)   SpO2 97%   BMI 25.96 kg/m²      Physical Exam  Vitals and nursing note reviewed.   Constitutional:       General: He is not in acute distress.     Appearance: He is well-developed. He is not ill-appearing or toxic-appearing.      Comments: Very pleasant   HENT:      Head: Normocephalic and atraumatic.   Eyes:      Pupils: Pupils are equal, round, and reactive to light.   Neck:      Thyroid: No thyromegaly.      Trachea: Phonation normal.   Cardiovascular:      Rate and Rhythm: Normal rate and regular rhythm.  No extrasystoles are present.     Heart sounds: No murmur heard.  Pulmonary:      Effort: Pulmonary effort is normal. No respiratory distress.      Breath sounds: Normal breath sounds. No stridor. No wheezing, rhonchi or rales.   Skin:     Coloration: Skin is " not pale.      Findings: No erythema.   Neurological:      Mental Status: He is alert.   Psychiatric:         Behavior: Behavior normal.         Thought Content: Thought content normal.         Judgment: Judgment normal.         Assessment/Plan    Diagnoses and all orders for this visit:    1. Essential hypertension (Primary)  -     Comprehensive Metabolic Panel; Future  -     losartan (COZAAR) 50 MG tablet; Take 1 tablet by mouth Daily.  Dispense: 30 tablet; Refill: 5  -     hydroCHLOROthiazide (MICROZIDE) 12.5 MG capsule; Take 1 capsule by mouth Every Morning.  Dispense: 30 capsule; Refill: 5    2. Prediabetes  -     Hemoglobin A1c; Future    3. Moderate persistent asthma without complication    4. Cognitive impairment  -     Ambulatory Referral to Home Health    5. Hyperlipidemia, unspecified hyperlipidemia type  -     Lipid Panel; Future  -     simvastatin (ZOCOR) 20 MG tablet; Take 1 tablet by mouth Every Night.  Dispense: 30 tablet; Refill: 5    6. Benign prostatic hyperplasia, unspecified whether lower urinary tract symptoms present  -     tamsulosin (FLOMAX) 0.4 MG capsule 24 hr capsule; Take 1 capsule by mouth Daily.  Dispense: 30 capsule; Refill: 5    7. Gastroesophageal reflux disease  -     pantoprazole (PROTONIX) 40 MG EC tablet; Take 1 tablet by mouth Daily.  Dispense: 30 tablet; Refill: 5      He is due for some blood work which we will obtain today.  Further work-up or management based on those results and symptoms.  Refill of the above medications as well as    Refer for home health/social work for assessment.  In my opinion the patient is homebound since he is unable to drive and perform other instrumental activities    Return in about 6 months (around 12/28/2022) for Medicare Wellness.      NAV Caputo MD  14:59 CDT  6/28/2022

## 2022-10-03 DIAGNOSIS — J45.40 MODERATE PERSISTENT ASTHMA WITHOUT COMPLICATION: ICD-10-CM

## 2022-10-03 RX ORDER — TIOTROPIUM BROMIDE INHALATION SPRAY 3.12 UG/1
2 SPRAY, METERED RESPIRATORY (INHALATION)
Qty: 4 G | Refills: 5 | Status: SHIPPED | OUTPATIENT
Start: 2022-10-03 | End: 2022-12-30 | Stop reason: SDUPTHER

## 2022-10-05 ENCOUNTER — TELEPHONE (OUTPATIENT)
Dept: INTERNAL MEDICINE | Facility: CLINIC | Age: 77
End: 2022-10-05

## 2022-10-05 NOTE — TELEPHONE ENCOUNTER
Caller: WILL GOEL    Relationship: Emergency Contact    Best call back number: [phone]    What form or medical record are you requesting: LETTER STATING THAT JUSTIN GOEL IS GOING TO BE PATIENT'S CAREGIVER     Who is requesting this form or medical record from you: DELROY REZA    How would you like to receive the form or medical records (pick-up, mail, fax): FAX  If fax, what is the fax number: [phone]  ATTENTION: STACY GUERRERO    Timeframe paperwork needed: ASAP

## 2022-10-11 ENCOUNTER — OFFICE VISIT (OUTPATIENT)
Dept: INTERNAL MEDICINE | Facility: CLINIC | Age: 77
End: 2022-10-11

## 2022-10-11 VITALS
OXYGEN SATURATION: 97 % | BODY MASS INDEX: 25.78 KG/M2 | HEIGHT: 64 IN | WEIGHT: 151 LBS | SYSTOLIC BLOOD PRESSURE: 112 MMHG | DIASTOLIC BLOOD PRESSURE: 74 MMHG | HEART RATE: 82 BPM

## 2022-10-11 DIAGNOSIS — J40 BRONCHITIS: Primary | ICD-10-CM

## 2022-10-11 PROCEDURE — 96372 THER/PROPH/DIAG INJ SC/IM: CPT | Performed by: NURSE PRACTITIONER

## 2022-10-11 PROCEDURE — 99213 OFFICE O/P EST LOW 20 MIN: CPT | Performed by: NURSE PRACTITIONER

## 2022-10-11 RX ORDER — BENZONATATE 200 MG/1
200 CAPSULE ORAL 3 TIMES DAILY PRN
COMMUNITY
End: 2022-12-30 | Stop reason: SDUPTHER

## 2022-10-11 RX ORDER — GUAIFENESIN 600 MG/1
1200 TABLET, EXTENDED RELEASE ORAL 2 TIMES DAILY
Qty: 40 TABLET | Refills: 0 | Status: SHIPPED | OUTPATIENT
Start: 2022-10-11

## 2022-10-11 RX ORDER — AZITHROMYCIN 250 MG/1
TABLET, FILM COATED ORAL
Qty: 6 TABLET | Refills: 0 | Status: SHIPPED | OUTPATIENT
Start: 2022-10-11 | End: 2022-12-30

## 2022-10-11 RX ORDER — CEFDINIR 300 MG/1
CAPSULE ORAL
COMMUNITY
Start: 2022-10-05 | End: 2022-12-30

## 2022-10-11 RX ORDER — PREDNISONE 10 MG/1
TABLET ORAL
Qty: 13 TABLET | Refills: 0 | Status: SHIPPED | OUTPATIENT
Start: 2022-10-11 | End: 2022-12-30

## 2022-10-11 RX ORDER — METHYLPREDNISOLONE ACETATE 40 MG/ML
40 INJECTION, SUSPENSION INTRA-ARTICULAR; INTRALESIONAL; INTRAMUSCULAR; SOFT TISSUE ONCE
Status: COMPLETED | OUTPATIENT
Start: 2022-10-11 | End: 2022-10-11

## 2022-10-11 RX ADMIN — METHYLPREDNISOLONE ACETATE 40 MG: 40 INJECTION, SUSPENSION INTRA-ARTICULAR; INTRALESIONAL; INTRAMUSCULAR; SOFT TISSUE at 12:17

## 2022-10-11 NOTE — PROGRESS NOTES
Chief Complaint   Patient presents with   • Cough         History:  Abhishek Mendieta is a 76 y.o. male who presents today for evaluation of the above problems.          Was seen at a walk-in clinic 10/5/22 for cough that had already been going on a week. Covid test negative. They gave him Omnicef, Tessalon perles, and medrol dose pack.  He has also been using albuterol nebulizer.  Caregiver says he is worse.  He is coughing up thick mucous, no blood. She is concerned because his wheezing is worse. No fever. No distress.       ROS:  Review of Systems  As above    No Known Allergies  Past Medical History:   Diagnosis Date   • Anxiety    • Hypertension      Past Surgical History:   Procedure Laterality Date   • PROSTATE SURGERY       Family History   Problem Relation Age of Onset   • Diabetes Mother    • Heart disease Mother    • Kidney disease Mother    • Cancer Father    • Asthma Brother    • Cancer Brother      Abhishek  reports that he has never smoked. He has never used smokeless tobacco. He reports that he does not drink alcohol and does not use drugs.    I have reviewed and updated the above documentation (if necessary) including but not limited to chief complaint, ROS, PFSH, allergies and medications        Current Outpatient Medications:   •  albuterol (ACCUNEB) 1.25 MG/3ML nebulizer solution, Take 3 mL by nebulization Every 6 (Six) Hours As Needed for Wheezing., Disp: 100 each, Rfl: 3  •  albuterol sulfate  (90 Base) MCG/ACT inhaler, Inhale 2 puffs Every 4 (Four) Hours As Needed for Wheezing., Disp: 18 g, Rfl: 5  •  aspirin 81 MG EC tablet, Take 1 tablet by mouth Daily., Disp: 30 tablet, Rfl: 5  •  benzonatate (TESSALON) 200 MG capsule, Take 1 capsule by mouth 3 (Three) Times a Day As Needed for Cough., Disp: , Rfl:   •  budesonide (PULMICORT) 0.5 MG/2ML nebulizer solution, Take 2 mL by nebulization Daily., Disp: 60 each, Rfl: 11  •  cefdinir (OMNICEF) 300 MG capsule, TAKE ONE CAPSULE BY MOUTH TWO TIMES A  "DAY FOR 10 DAYS, Disp: , Rfl:   •  cetirizine (zyrTEC) 10 MG tablet, Take 1 tablet by mouth Daily., Disp: 30 tablet, Rfl: 11  •  cholecalciferol (VITAMIN D3) 25 MCG (1000 UT) tablet, Take 1 tablet by mouth Daily., Disp: 30 tablet, Rfl: 5  •  hydroCHLOROthiazide (MICROZIDE) 12.5 MG capsule, Take 1 capsule by mouth Every Morning., Disp: 30 capsule, Rfl: 5  •  ipratropium-albuterol (DUO-NEB) 0.5-2.5 mg/3 ml nebulizer, USE 1 VIAL IN NEBULIZER EVERY 6 HOURS, Disp: 360 mL, Rfl: 11  •  losartan (COZAAR) 50 MG tablet, Take 1 tablet by mouth Daily., Disp: 30 tablet, Rfl: 5  •  montelukast (SINGULAIR) 10 MG tablet, Take 1 tablet by mouth Every Night., Disp: 30 tablet, Rfl: 5  •  pantoprazole (PROTONIX) 40 MG EC tablet, Take 1 tablet by mouth Daily., Disp: 30 tablet, Rfl: 5  •  simvastatin (ZOCOR) 20 MG tablet, Take 1 tablet by mouth Every Night., Disp: 30 tablet, Rfl: 5  •  tamsulosin (FLOMAX) 0.4 MG capsule 24 hr capsule, Take 1 capsule by mouth Daily., Disp: 30 capsule, Rfl: 5  •  tiotropium bromide monohydrate (Spiriva Respimat) 2.5 MCG/ACT aerosol solution inhaler, Inhale 2 puffs Daily., Disp: 4 g, Rfl: 5  •  vitamin C (ASCORBIC ACID) 500 MG tablet, Take 1 tablet by mouth Daily., Disp: 30 tablet, Rfl: 5  •  azithromycin (Zithromax Z-Jc) 250 MG tablet, Take 2 tablets by mouth on day 1, then 1 tablet daily on days 2-5, Disp: 6 tablet, Rfl: 0  •  guaiFENesin (Mucinex) 600 MG 12 hr tablet, Take 2 tablets by mouth 2 (Two) Times a Day. Take with full glass of water., Disp: 40 tablet, Rfl: 0  •  predniSONE (DELTASONE) 10 MG tablet, Start this medication Wenesday, 10/12/22. Take one tablet 3x/day for 2 days, then one tablet 2x/day for 2 days, then 1 daily for 2 days, then 1/2 daily., Disp: 13 tablet, Rfl: 0  No current facility-administered medications for this visit.    OBJECTIVE:  Visit Vitals  /74 (BP Location: Right arm, Patient Position: Sitting, Cuff Size: Adult)   Pulse 82   Ht 162.6 cm (64\")   Wt 68.5 kg (151 lb) "   SpO2 97%   BMI 25.92 kg/m²      Physical Exam  Vitals and nursing note reviewed.   Constitutional:       General: He is not in acute distress.     Appearance: Normal appearance. He is not ill-appearing, toxic-appearing or diaphoretic.      Comments: No distress, happy.  Coughing up thick mucous.   HENT:      Head: Normocephalic and atraumatic.      Right Ear: Tympanic membrane, ear canal and external ear normal.      Left Ear: Ear canal and external ear normal. There is impacted cerumen.      Nose: Nose normal. No congestion or rhinorrhea.      Mouth/Throat:      Mouth: Mucous membranes are moist.      Pharynx: Oropharynx is clear. No oropharyngeal exudate or posterior oropharyngeal erythema.   Eyes:      General: No scleral icterus.        Right eye: No discharge.         Left eye: No discharge.      Conjunctiva/sclera: Conjunctivae normal.      Pupils: Pupils are equal, round, and reactive to light.   Cardiovascular:      Rate and Rhythm: Normal rate and regular rhythm.   Pulmonary:      Effort: Pulmonary effort is normal. No respiratory distress.      Breath sounds: Wheezing (bilateral heavy inspiratory and expiratory wheezes all lung fields) present.   Abdominal:      General: There is no distension.      Palpations: Abdomen is soft.      Tenderness: There is no abdominal tenderness.   Musculoskeletal:      Cervical back: Neck supple.      Right lower leg: No edema.      Left lower leg: No edema.   Lymphadenopathy:      Cervical: No cervical adenopathy.   Skin:     General: Skin is warm and dry.   Neurological:      Mental Status: He is alert and oriented to person, place, and time.   Psychiatric:         Mood and Affect: Mood normal.         Behavior: Behavior normal.         Thought Content: Thought content normal.         Judgment: Judgment normal.         [facility]    Assessment/Plan    Diagnoses and all orders for this visit:    1. Bronchitis (Primary)  -     methylPREDNISolone acetate (DEPO-medrol) injection  40 mg  -     predniSONE (DELTASONE) 10 MG tablet; Start this medication Wenesday, 10/12/22. Take one tablet 3x/day for 2 days, then one tablet 2x/day for 2 days, then 1 daily for 2 days, then 1/2 daily.  Dispense: 13 tablet; Refill: 0  -     azithromycin (Zithromax Z-Jc) 250 MG tablet; Take 2 tablets by mouth on day 1, then 1 tablet daily on days 2-5  Dispense: 6 tablet; Refill: 0  -     guaiFENesin (Mucinex) 600 MG 12 hr tablet; Take 2 tablets by mouth 2 (Two) Times a Day. Take with full glass of water.  Dispense: 40 tablet; Refill: 0      Depo-medrol given here. Begin Prednisone tomorrow.    Add Z-pack and Mucinex.  Continue nebulizer. Asked them to let us know if he is not improving over the next few days.        Education materials and an After Visit Summary were printed and given to the patient at discharge.  Return if symptoms worsen or fail to improve, for Next scheduled follow up.         Nya Germain, BRETT   12:55 CDT  10/11/2022

## 2022-12-30 ENCOUNTER — OFFICE VISIT (OUTPATIENT)
Dept: INTERNAL MEDICINE | Facility: CLINIC | Age: 77
End: 2022-12-30

## 2022-12-30 VITALS
HEART RATE: 80 BPM | OXYGEN SATURATION: 97 % | BODY MASS INDEX: 26.29 KG/M2 | SYSTOLIC BLOOD PRESSURE: 130 MMHG | WEIGHT: 154 LBS | HEIGHT: 64 IN | DIASTOLIC BLOOD PRESSURE: 62 MMHG

## 2022-12-30 DIAGNOSIS — I10 ESSENTIAL HYPERTENSION: ICD-10-CM

## 2022-12-30 DIAGNOSIS — Z00.00 MEDICARE ANNUAL WELLNESS VISIT, SUBSEQUENT: Primary | ICD-10-CM

## 2022-12-30 DIAGNOSIS — R73.03 PREDIABETES: ICD-10-CM

## 2022-12-30 DIAGNOSIS — E78.5 HYPERLIPIDEMIA, UNSPECIFIED HYPERLIPIDEMIA TYPE: ICD-10-CM

## 2022-12-30 DIAGNOSIS — J45.40 MODERATE PERSISTENT ASTHMA WITHOUT COMPLICATION: ICD-10-CM

## 2022-12-30 PROCEDURE — 1159F MED LIST DOCD IN RCRD: CPT | Performed by: INTERNAL MEDICINE

## 2022-12-30 PROCEDURE — 91312 COVID-19 (PFIZER) BIVALENT BOOSTER 12+YRS: CPT | Performed by: INTERNAL MEDICINE

## 2022-12-30 PROCEDURE — 1160F RVW MEDS BY RX/DR IN RCRD: CPT | Performed by: INTERNAL MEDICINE

## 2022-12-30 PROCEDURE — 0124A PR ADM SARSCOV2 30MCG/0.3ML BST: CPT | Performed by: INTERNAL MEDICINE

## 2022-12-30 PROCEDURE — 1170F FXNL STATUS ASSESSED: CPT | Performed by: INTERNAL MEDICINE

## 2022-12-30 PROCEDURE — G0439 PPPS, SUBSEQ VISIT: HCPCS | Performed by: INTERNAL MEDICINE

## 2022-12-30 PROCEDURE — 1126F AMNT PAIN NOTED NONE PRSNT: CPT | Performed by: INTERNAL MEDICINE

## 2022-12-30 RX ORDER — BENZONATATE 200 MG/1
200 CAPSULE ORAL 3 TIMES DAILY PRN
Qty: 90 CAPSULE | Refills: 2 | Status: SHIPPED | OUTPATIENT
Start: 2022-12-30

## 2022-12-30 RX ORDER — TIOTROPIUM BROMIDE INHALATION SPRAY 3.12 UG/1
2 SPRAY, METERED RESPIRATORY (INHALATION)
Qty: 4 G | Refills: 5 | Status: SHIPPED | OUTPATIENT
Start: 2022-12-30

## 2022-12-30 RX ORDER — FLUTICASONE PROPIONATE AND SALMETEROL XINAFOATE 115; 21 UG/1; UG/1
2 AEROSOL, METERED RESPIRATORY (INHALATION)
Qty: 12 G | Refills: 11 | Status: SHIPPED | OUTPATIENT
Start: 2022-12-30

## 2022-12-30 NOTE — PROGRESS NOTES
The ABCs of the Annual Wellness Visit  Subsequent Medicare Wellness Visit    Subjective      Abhishek Mendieta is a 77 y.o. male who presents for a Subsequent Medicare Wellness Visit.    Cough at night. Otherwise no new issues or complaints.    The following portions of the patient's history were reviewed and   updated as appropriate: allergies, current medications, past family history, past medical history, past social history, past surgical history and problem list.    Compared to one year ago, the patient feels his physical   health is better.    Compared to one year ago, the patient feels his mental   health is better.    Recent Hospitalizations:  He was not admitted to the hospital during the last year.       Current Medical Providers:  Patient Care Team:  DENNIS Caputo MD as PCP - General (Internal Medicine)    Outpatient Medications Prior to Visit   Medication Sig Dispense Refill   • albuterol (ACCUNEB) 1.25 MG/3ML nebulizer solution Take 3 mL by nebulization Every 6 (Six) Hours As Needed for Wheezing. 100 each 3   • albuterol sulfate  (90 Base) MCG/ACT inhaler Inhale 2 puffs Every 4 (Four) Hours As Needed for Wheezing. 18 g 5   • aspirin 81 MG EC tablet Take 1 tablet by mouth Daily. 30 tablet 5   • budesonide (PULMICORT) 0.5 MG/2ML nebulizer solution Take 2 mL by nebulization Daily. 60 each 11   • cetirizine (zyrTEC) 10 MG tablet Take 1 tablet by mouth Daily. 30 tablet 11   • cholecalciferol (VITAMIN D3) 25 MCG (1000 UT) tablet Take 1 tablet by mouth Daily. 30 tablet 5   • guaiFENesin (Mucinex) 600 MG 12 hr tablet Take 2 tablets by mouth 2 (Two) Times a Day. Take with full glass of water. 40 tablet 0   • hydroCHLOROthiazide (MICROZIDE) 12.5 MG capsule Take 1 capsule by mouth Every Morning. 30 capsule 5   • ipratropium-albuterol (DUO-NEB) 0.5-2.5 mg/3 ml nebulizer USE 1 VIAL IN NEBULIZER EVERY 6 HOURS 360 mL 11   • losartan (COZAAR) 50 MG tablet Take 1 tablet by mouth Daily. 30 tablet 5   •  montelukast (SINGULAIR) 10 MG tablet Take 1 tablet by mouth Every Night. 30 tablet 5   • pantoprazole (PROTONIX) 40 MG EC tablet Take 1 tablet by mouth Daily. 30 tablet 5   • simvastatin (ZOCOR) 20 MG tablet Take 1 tablet by mouth Every Night. 30 tablet 5   • tamsulosin (FLOMAX) 0.4 MG capsule 24 hr capsule Take 1 capsule by mouth Daily. 30 capsule 5   • vitamin C (ASCORBIC ACID) 500 MG tablet Take 1 tablet by mouth Daily. 30 tablet 5   • benzonatate (TESSALON) 200 MG capsule Take 1 capsule by mouth 3 (Three) Times a Day As Needed for Cough.     • tiotropium bromide monohydrate (Spiriva Respimat) 2.5 MCG/ACT aerosol solution inhaler Inhale 2 puffs Daily. 4 g 5   • azithromycin (Zithromax Z-Jc) 250 MG tablet Take 2 tablets by mouth on day 1, then 1 tablet daily on days 2-5 6 tablet 0   • cefdinir (OMNICEF) 300 MG capsule TAKE ONE CAPSULE BY MOUTH TWO TIMES A DAY FOR 10 DAYS     • predniSONE (DELTASONE) 10 MG tablet Start this medication Wenesday, 10/12/22. Take one tablet 3x/day for 2 days, then one tablet 2x/day for 2 days, then 1 daily for 2 days, then 1/2 daily. 13 tablet 0     No facility-administered medications prior to visit.       No opioid medication identified on active medication list. I have reviewed chart for other potential  high risk medication/s and harmful drug interactions in the elderly.          Aspirin is on active medication list. Aspirin use is indicated based on review of current medical condition/s. Pros and cons of this therapy have been discussed today. Benefits of this medication outweigh potential harm.  Patient has been encouraged to continue taking this medication.  .      Patient Active Problem List   Diagnosis   • Prediabetes   • Essential hypertension   • Hyperlipidemia   • Gastroesophageal reflux disease   • Benign prostatic hyperplasia   • Moderate persistent asthma without complication   • Vitamin D deficiency     Advance Care Planning  Advance Directive is not on file.  ACP  "discussion was held with the patient during this visit. Patient does not have an advance directive, information provided.     Objective    Vitals:    12/30/22 1312   BP: 130/62   BP Location: Right arm   Patient Position: Sitting   Cuff Size: Adult   Pulse: 80   SpO2: 97%   Weight: 69.9 kg (154 lb)   Height: 162.6 cm (64\")     Mild end expiratory wheezes on physical exam  Estimated body mass index is 26.43 kg/m² as calculated from the following:    Height as of this encounter: 162.6 cm (64\").    Weight as of this encounter: 69.9 kg (154 lb).    BMI is >= 25 and <30. (Overweight) The following options were offered after discussion;: exercise counseling/recommendations      Does the patient have evidence of cognitive impairment?   Yes: Continue current medications.            HEALTH RISK ASSESSMENT    Smoking Status:  Social History     Tobacco Use   Smoking Status Never   Smokeless Tobacco Never     Alcohol Consumption:  Social History     Substance and Sexual Activity   Alcohol Use Never     Fall Risk Screen:    STEADI Fall Risk Assessment was completed, and patient is at LOW risk for falls.Assessment completed on:12/30/2022    Depression Screening:  PHQ-2/PHQ-9 Depression Screening 12/30/2022   Little Interest or Pleasure in Doing Things 0-->not at all   Feeling Down, Depressed or Hopeless 0-->not at all   PHQ-9: Brief Depression Severity Measure Score 0       Health Habits and Functional and Cognitive Screening:  Functional & Cognitive Status 12/30/2022   Do you have difficulty preparing food and eating? Yes   Do you have difficulty bathing yourself, getting dressed or grooming yourself? No   Do you have difficulty using the toilet? No   Do you have difficulty moving around from place to place? No   Do you have trouble with steps or getting out of a bed or a chair? No   Current Diet Well Balanced Diet   Dental Exam Not up to date   Eye Exam Not up to date   Exercise (times per week) 2 times per week   Current " Exercises Include Walking   Current Exercise Activities Include -   Do you need help using the phone?  Yes   Are you deaf or do you have serious difficulty hearing?  No   Do you need help with transportation? Yes   Do you need help shopping? Yes   Do you need help preparing meals?  Yes   Do you need help with housework?  Yes   Do you need help with laundry? Yes   Do you need help taking your medications? Yes   Do you need help managing money? Yes   Do you ever drive or ride in a car without wearing a seat belt? No   Have you felt unusual stress, anger or loneliness in the last month? No   Who do you live with? Sibling   If you need help, do you have trouble finding someone available to you? Yes   Have you been bothered in the last four weeks by sexual problems? -   Do you have difficulty concentrating, remembering or making decisions? Yes       Age-appropriate Screening Schedule:  Refer to the list below for future screening recommendations based on patient's age, sex and/or medical conditions. Orders for these recommended tests are listed in the plan section. The patient has been provided with a written plan.    Health Maintenance   Topic Date Due   • TDAP/TD VACCINES (1 - Tdap) Never done   • ZOSTER VACCINE (1 of 2) Never done   • LIPID PANEL  06/28/2023   • INFLUENZA VACCINE  Completed                CMS Preventative Services Quick Reference  Risk Factors Identified During Encounter:    Immunizations Discussed/Encouraged: COVID19    The above risks/problems have been discussed with the patient.  Pertinent information has been shared with the patient in the After Visit Summary.    Diagnoses and all orders for this visit:    1. Medicare annual wellness visit, subsequent (Primary)    2. Moderate persistent asthma without complication  -     tiotropium bromide monohydrate (Spiriva Respimat) 2.5 MCG/ACT aerosol solution inhaler; Inhale 2 puffs Daily.  Dispense: 4 g; Refill: 5    3. Essential hypertension    4.  Prediabetes    5. Hyperlipidemia, unspecified hyperlipidemia type    Other orders  -     benzonatate (TESSALON) 200 MG capsule; Take 1 capsule by mouth 3 (Three) Times a Day As Needed for Cough.  Dispense: 90 capsule; Refill: 2  -     COVID-19 Bivalent Booster (Pfizer) 12+yrs  -     fluticasone-salmeterol (Advair HFA) 115-21 MCG/ACT inhaler; Inhale 2 puffs 2 (Two) Times a Day.  Dispense: 12 g; Refill: 11        Follow Up:   6 months    An After Visit Summary and PPPS were made available to the patient.

## 2023-01-05 DIAGNOSIS — J45.40 MODERATE PERSISTENT ASTHMA WITHOUT COMPLICATION: ICD-10-CM

## 2023-01-09 RX ORDER — IPRATROPIUM BROMIDE AND ALBUTEROL SULFATE 2.5; .5 MG/3ML; MG/3ML
3 SOLUTION RESPIRATORY (INHALATION)
Qty: 3 ML | Refills: 11 | OUTPATIENT
Start: 2023-01-09

## 2023-01-09 RX ORDER — BUDESONIDE 0.5 MG/2ML
0.5 INHALANT ORAL
Qty: 60 EACH | Refills: 11 | OUTPATIENT
Start: 2023-01-09

## 2023-02-01 DIAGNOSIS — E78.5 HYPERLIPIDEMIA, UNSPECIFIED HYPERLIPIDEMIA TYPE: ICD-10-CM

## 2023-02-01 RX ORDER — SIMVASTATIN 20 MG
20 TABLET ORAL NIGHTLY
Qty: 30 TABLET | Refills: 5 | Status: SHIPPED | OUTPATIENT
Start: 2023-02-01

## 2023-04-10 RX ORDER — IPRATROPIUM BROMIDE AND ALBUTEROL SULFATE 2.5; .5 MG/3ML; MG/3ML
SOLUTION RESPIRATORY (INHALATION)
Qty: 360 ML | Refills: 11 | Status: SHIPPED | OUTPATIENT
Start: 2023-04-10

## 2023-04-10 NOTE — TELEPHONE ENCOUNTER
Rx Refill Note  Requested Prescriptions     Pending Prescriptions Disp Refills   • ipratropium-albuterol (DUO-NEB) 0.5-2.5 mg/3 ml nebulizer [Pharmacy Med Name: ipratropium 0.5 mg-albuterol 3 mg (2.5 mg base)/3 mL nebulization soln]  11     Sig: USE 1 VIAL IN NEBULIZER EVERY 6 HOURS      Last office visit with prescribing clinician: 12/30/2022   Last telemedicine visit with prescribing clinician: 6/30/2023   Next office visit with prescribing clinician: Visit date not found                         Would you like a call back once the refill request has been completed: [] Yes [] No    If the office needs to give you a call back, can they leave a voicemail: [] Yes [] No    WESLY Lopez  04/10/23, 10:55 CDT

## 2023-06-30 PROBLEM — Z85.46 HISTORY OF PROSTATE CANCER: Status: ACTIVE | Noted: 2023-06-30

## 2023-08-04 DIAGNOSIS — E78.5 HYPERLIPIDEMIA, UNSPECIFIED HYPERLIPIDEMIA TYPE: ICD-10-CM

## 2023-08-04 RX ORDER — SIMVASTATIN 20 MG
20 TABLET ORAL NIGHTLY
Qty: 30 TABLET | Refills: 5 | Status: SHIPPED | OUTPATIENT
Start: 2023-08-04

## 2023-11-12 ENCOUNTER — APPOINTMENT (OUTPATIENT)
Dept: CT IMAGING | Facility: HOSPITAL | Age: 78
End: 2023-11-12
Payer: MEDICARE

## 2023-11-12 ENCOUNTER — HOSPITAL ENCOUNTER (INPATIENT)
Facility: HOSPITAL | Age: 78
LOS: 3 days | Discharge: HOME | End: 2023-11-15
Attending: EMERGENCY MEDICINE | Admitting: INTERNAL MEDICINE
Payer: MEDICARE

## 2023-11-12 ENCOUNTER — APPOINTMENT (OUTPATIENT)
Dept: GENERAL RADIOLOGY | Facility: HOSPITAL | Age: 78
End: 2023-11-12
Payer: MEDICARE

## 2023-11-12 DIAGNOSIS — K81.0 ACUTE CHOLECYSTITIS: Primary | ICD-10-CM

## 2023-11-12 DIAGNOSIS — Z78.9 DECREASED ACTIVITIES OF DAILY LIVING (ADL): ICD-10-CM

## 2023-11-12 DIAGNOSIS — Z74.09 IMPAIRED MOBILITY: ICD-10-CM

## 2023-11-12 DIAGNOSIS — N17.9 ACUTE KIDNEY INJURY: ICD-10-CM

## 2023-11-12 LAB
ALBUMIN SERPL-MCNC: 3.7 G/DL (ref 3.5–5.2)
ALBUMIN/GLOB SERPL: 1 G/DL
ALP SERPL-CCNC: 72 U/L (ref 39–117)
ALT SERPL W P-5'-P-CCNC: 17 U/L (ref 1–41)
ANION GAP SERPL CALCULATED.3IONS-SCNC: 14 MMOL/L (ref 5–15)
AST SERPL-CCNC: 23 U/L (ref 1–40)
BASOPHILS # BLD AUTO: 0.04 10*3/MM3 (ref 0–0.2)
BASOPHILS NFR BLD AUTO: 0.2 % (ref 0–1.5)
BILIRUB SERPL-MCNC: 1.1 MG/DL (ref 0–1.2)
BUN SERPL-MCNC: 45 MG/DL (ref 8–23)
BUN/CREAT SERPL: 16.2 (ref 7–25)
CALCIUM SPEC-SCNC: 9.2 MG/DL (ref 8.6–10.5)
CHLORIDE SERPL-SCNC: 97 MMOL/L (ref 98–107)
CO2 SERPL-SCNC: 24 MMOL/L (ref 22–29)
CREAT SERPL-MCNC: 2.78 MG/DL (ref 0.76–1.27)
D-LACTATE SERPL-SCNC: 1.9 MMOL/L (ref 0.5–2)
D-LACTATE SERPL-SCNC: 2.3 MMOL/L (ref 0.5–2)
DEPRECATED RDW RBC AUTO: 44.9 FL (ref 37–54)
EGFRCR SERPLBLD CKD-EPI 2021: 22.6 ML/MIN/1.73
EOSINOPHIL # BLD AUTO: 0.01 10*3/MM3 (ref 0–0.4)
EOSINOPHIL NFR BLD AUTO: 0 % (ref 0.3–6.2)
ERYTHROCYTE [DISTWIDTH] IN BLOOD BY AUTOMATED COUNT: 13 % (ref 12.3–15.4)
GLOBULIN UR ELPH-MCNC: 3.7 GM/DL
GLUCOSE SERPL-MCNC: 161 MG/DL (ref 65–99)
HCT VFR BLD AUTO: 42.9 % (ref 37.5–51)
HGB BLD-MCNC: 14.4 G/DL (ref 13–17.7)
IMM GRANULOCYTES # BLD AUTO: 0.16 10*3/MM3 (ref 0–0.05)
IMM GRANULOCYTES NFR BLD AUTO: 0.7 % (ref 0–0.5)
INR PPP: 1.2 (ref 0.91–1.09)
LIPASE SERPL-CCNC: 24 U/L (ref 13–60)
LYMPHOCYTES # BLD AUTO: 0.71 10*3/MM3 (ref 0.7–3.1)
LYMPHOCYTES NFR BLD AUTO: 3.3 % (ref 19.6–45.3)
MAGNESIUM SERPL-MCNC: 2.2 MG/DL (ref 1.6–2.4)
MCH RBC QN AUTO: 31.8 PG (ref 26.6–33)
MCHC RBC AUTO-ENTMCNC: 33.6 G/DL (ref 31.5–35.7)
MCV RBC AUTO: 94.7 FL (ref 79–97)
MONOCYTES # BLD AUTO: 1.92 10*3/MM3 (ref 0.1–0.9)
MONOCYTES NFR BLD AUTO: 8.9 % (ref 5–12)
NEUTROPHILS NFR BLD AUTO: 18.65 10*3/MM3 (ref 1.7–7)
NEUTROPHILS NFR BLD AUTO: 86.9 % (ref 42.7–76)
NRBC BLD AUTO-RTO: 0 /100 WBC (ref 0–0.2)
PLATELET # BLD AUTO: 197 10*3/MM3 (ref 140–450)
PMV BLD AUTO: 10.2 FL (ref 6–12)
POTASSIUM SERPL-SCNC: 3.2 MMOL/L (ref 3.5–5.2)
PROT SERPL-MCNC: 7.4 G/DL (ref 6–8.5)
PROTHROMBIN TIME: 15.4 SECONDS (ref 11.8–14.8)
RBC # BLD AUTO: 4.53 10*6/MM3 (ref 4.14–5.8)
SODIUM SERPL-SCNC: 135 MMOL/L (ref 136–145)
WBC NRBC COR # BLD: 21.49 10*3/MM3 (ref 3.4–10.8)

## 2023-11-12 PROCEDURE — 25010000002 PIPERACILLIN SOD-TAZOBACTAM PER 1 G: Performed by: HOSPITALIST

## 2023-11-12 PROCEDURE — 25010000002 POTASSIUM CHLORIDE 10 MEQ/100ML SOLUTION: Performed by: HOSPITALIST

## 2023-11-12 PROCEDURE — 94799 UNLISTED PULMONARY SVC/PX: CPT

## 2023-11-12 PROCEDURE — 85610 PROTHROMBIN TIME: CPT | Performed by: EMERGENCY MEDICINE

## 2023-11-12 PROCEDURE — 83735 ASSAY OF MAGNESIUM: CPT | Performed by: EMERGENCY MEDICINE

## 2023-11-12 PROCEDURE — 80053 COMPREHEN METABOLIC PANEL: CPT | Performed by: EMERGENCY MEDICINE

## 2023-11-12 PROCEDURE — 83690 ASSAY OF LIPASE: CPT | Performed by: EMERGENCY MEDICINE

## 2023-11-12 PROCEDURE — 85025 COMPLETE CBC W/AUTO DIFF WBC: CPT | Performed by: EMERGENCY MEDICINE

## 2023-11-12 PROCEDURE — 25010000002 METHYLPREDNISOLONE PER 40 MG: Performed by: INTERNAL MEDICINE

## 2023-11-12 PROCEDURE — 74176 CT ABD & PELVIS W/O CONTRAST: CPT

## 2023-11-12 PROCEDURE — 99285 EMERGENCY DEPT VISIT HI MDM: CPT

## 2023-11-12 PROCEDURE — 83605 ASSAY OF LACTIC ACID: CPT | Performed by: EMERGENCY MEDICINE

## 2023-11-12 PROCEDURE — 25010000002 PIPERACILLIN SOD-TAZOBACTAM PER 1 G: Performed by: EMERGENCY MEDICINE

## 2023-11-12 PROCEDURE — 25810000003 SODIUM CHLORIDE 0.9 % SOLUTION: Performed by: HOSPITALIST

## 2023-11-12 PROCEDURE — 71045 X-RAY EXAM CHEST 1 VIEW: CPT

## 2023-11-12 PROCEDURE — 25810000003 LACTATED RINGERS SOLUTION: Performed by: EMERGENCY MEDICINE

## 2023-11-12 PROCEDURE — 87040 BLOOD CULTURE FOR BACTERIA: CPT | Performed by: EMERGENCY MEDICINE

## 2023-11-12 PROCEDURE — 25010000002 MORPHINE PER 10 MG: Performed by: EMERGENCY MEDICINE

## 2023-11-12 PROCEDURE — 36415 COLL VENOUS BLD VENIPUNCTURE: CPT | Performed by: EMERGENCY MEDICINE

## 2023-11-12 PROCEDURE — 94640 AIRWAY INHALATION TREATMENT: CPT

## 2023-11-12 PROCEDURE — 25010000002 ONDANSETRON PER 1 MG: Performed by: EMERGENCY MEDICINE

## 2023-11-12 RX ORDER — POTASSIUM CHLORIDE 7.45 MG/ML
10 INJECTION INTRAVENOUS
Status: COMPLETED | OUTPATIENT
Start: 2023-11-12 | End: 2023-11-12

## 2023-11-12 RX ORDER — FAMOTIDINE 10 MG/ML
20 INJECTION, SOLUTION INTRAVENOUS DAILY
Status: DISCONTINUED | OUTPATIENT
Start: 2023-11-13 | End: 2023-11-15 | Stop reason: HOSPADM

## 2023-11-12 RX ORDER — SODIUM CHLORIDE 9 MG/ML
100 INJECTION, SOLUTION INTRAVENOUS CONTINUOUS
Status: DISCONTINUED | OUTPATIENT
Start: 2023-11-12 | End: 2023-11-15 | Stop reason: HOSPADM

## 2023-11-12 RX ORDER — FAMOTIDINE 10 MG/ML
20 INJECTION, SOLUTION INTRAVENOUS ONCE
Status: COMPLETED | OUTPATIENT
Start: 2023-11-12 | End: 2023-11-12

## 2023-11-12 RX ORDER — TAMSULOSIN HYDROCHLORIDE 0.4 MG/1
0.4 CAPSULE ORAL DAILY
Status: DISCONTINUED | OUTPATIENT
Start: 2023-11-12 | End: 2023-11-13

## 2023-11-12 RX ORDER — AMOXICILLIN 250 MG
2 CAPSULE ORAL 2 TIMES DAILY
Status: DISCONTINUED | OUTPATIENT
Start: 2023-11-12 | End: 2023-11-13

## 2023-11-12 RX ORDER — ONDANSETRON 2 MG/ML
4 INJECTION INTRAMUSCULAR; INTRAVENOUS ONCE
Status: COMPLETED | OUTPATIENT
Start: 2023-11-12 | End: 2023-11-12

## 2023-11-12 RX ORDER — CETIRIZINE HYDROCHLORIDE 10 MG/1
10 TABLET ORAL DAILY
Status: DISCONTINUED | OUTPATIENT
Start: 2023-11-12 | End: 2023-11-13

## 2023-11-12 RX ORDER — MORPHINE SULFATE 2 MG/ML
2 INJECTION, SOLUTION INTRAMUSCULAR; INTRAVENOUS EVERY 4 HOURS PRN
Status: DISCONTINUED | OUTPATIENT
Start: 2023-11-12 | End: 2023-11-13

## 2023-11-12 RX ORDER — METHYLPREDNISOLONE SODIUM SUCCINATE 40 MG/ML
40 INJECTION, POWDER, LYOPHILIZED, FOR SOLUTION INTRAMUSCULAR; INTRAVENOUS ONCE
Status: COMPLETED | OUTPATIENT
Start: 2023-11-12 | End: 2023-11-12

## 2023-11-12 RX ORDER — POLYETHYLENE GLYCOL 3350 17 G/17G
17 POWDER, FOR SOLUTION ORAL DAILY PRN
Status: DISCONTINUED | OUTPATIENT
Start: 2023-11-12 | End: 2023-11-13

## 2023-11-12 RX ORDER — SODIUM CHLORIDE 9 MG/ML
40 INJECTION, SOLUTION INTRAVENOUS AS NEEDED
Status: DISCONTINUED | OUTPATIENT
Start: 2023-11-12 | End: 2023-11-15 | Stop reason: HOSPADM

## 2023-11-12 RX ORDER — BUDESONIDE AND FORMOTEROL FUMARATE DIHYDRATE 160; 4.5 UG/1; UG/1
1 AEROSOL RESPIRATORY (INHALATION)
Status: DISCONTINUED | OUTPATIENT
Start: 2023-11-12 | End: 2023-11-13

## 2023-11-12 RX ORDER — ALBUTEROL SULFATE 1.25 MG/3ML
1 SOLUTION RESPIRATORY (INHALATION)
Status: DISCONTINUED | OUTPATIENT
Start: 2023-11-12 | End: 2023-11-13

## 2023-11-12 RX ORDER — SODIUM CHLORIDE 0.9 % (FLUSH) 0.9 %
10 SYRINGE (ML) INJECTION AS NEEDED
Status: DISCONTINUED | OUTPATIENT
Start: 2023-11-12 | End: 2023-11-15 | Stop reason: HOSPADM

## 2023-11-12 RX ORDER — SODIUM CHLORIDE 0.9 % (FLUSH) 0.9 %
10 SYRINGE (ML) INJECTION EVERY 12 HOURS SCHEDULED
Status: DISCONTINUED | OUTPATIENT
Start: 2023-11-12 | End: 2023-11-13

## 2023-11-12 RX ORDER — ALBUTEROL SULFATE 2.5 MG/3ML
2.5 SOLUTION RESPIRATORY (INHALATION) EVERY 6 HOURS PRN
Status: DISCONTINUED | OUTPATIENT
Start: 2023-11-12 | End: 2023-11-13

## 2023-11-12 RX ORDER — BISACODYL 10 MG
10 SUPPOSITORY, RECTAL RECTAL DAILY PRN
Status: DISCONTINUED | OUTPATIENT
Start: 2023-11-12 | End: 2023-11-13

## 2023-11-12 RX ORDER — ACETAMINOPHEN 325 MG/1
650 TABLET ORAL EVERY 6 HOURS PRN
Status: DISCONTINUED | OUTPATIENT
Start: 2023-11-12 | End: 2023-11-13

## 2023-11-12 RX ORDER — ALBUTEROL SULFATE 1.25 MG/3ML
1 SOLUTION RESPIRATORY (INHALATION) EVERY 6 HOURS PRN
Status: DISCONTINUED | OUTPATIENT
Start: 2023-11-12 | End: 2023-11-12

## 2023-11-12 RX ORDER — BISACODYL 5 MG/1
5 TABLET, DELAYED RELEASE ORAL DAILY PRN
Status: DISCONTINUED | OUTPATIENT
Start: 2023-11-12 | End: 2023-11-13

## 2023-11-12 RX ORDER — ATORVASTATIN CALCIUM 10 MG/1
20 TABLET, FILM COATED ORAL DAILY
Status: DISCONTINUED | OUTPATIENT
Start: 2023-11-12 | End: 2023-11-13

## 2023-11-12 RX ORDER — ONDANSETRON 2 MG/ML
4 INJECTION INTRAMUSCULAR; INTRAVENOUS EVERY 6 HOURS PRN
Status: DISCONTINUED | OUTPATIENT
Start: 2023-11-12 | End: 2023-11-13

## 2023-11-12 RX ADMIN — SODIUM CHLORIDE, POTASSIUM CHLORIDE, SODIUM LACTATE AND CALCIUM CHLORIDE 1000 ML: 600; 310; 30; 20 INJECTION, SOLUTION INTRAVENOUS at 15:22

## 2023-11-12 RX ADMIN — POTASSIUM CHLORIDE 10 MEQ: 7.46 INJECTION, SOLUTION INTRAVENOUS at 20:07

## 2023-11-12 RX ADMIN — SODIUM CHLORIDE, POTASSIUM CHLORIDE, SODIUM LACTATE AND CALCIUM CHLORIDE 1000 ML: 600; 310; 30; 20 INJECTION, SOLUTION INTRAVENOUS at 13:27

## 2023-11-12 RX ADMIN — SODIUM CHLORIDE 100 ML/HR: 9 INJECTION, SOLUTION INTRAVENOUS at 17:24

## 2023-11-12 RX ADMIN — METHYLPREDNISOLONE SODIUM SUCCINATE 40 MG: 40 INJECTION INTRAMUSCULAR; INTRAVENOUS at 22:46

## 2023-11-12 RX ADMIN — ONDANSETRON 4 MG: 2 INJECTION INTRAMUSCULAR; INTRAVENOUS at 13:29

## 2023-11-12 RX ADMIN — PIPERACILLIN SODIUM AND TAZOBACTAM SODIUM 3.38 G: 3; .375 INJECTION, SOLUTION INTRAVENOUS at 22:19

## 2023-11-12 RX ADMIN — ALBUTEROL SULFATE 1.25 MG: 1.25 SOLUTION RESPIRATORY (INHALATION) at 22:49

## 2023-11-12 RX ADMIN — DOCUSATE SODIUM 50 MG AND SENNOSIDES 8.6 MG 2 TABLET: 8.6; 5 TABLET, FILM COATED ORAL at 21:08

## 2023-11-12 RX ADMIN — SODIUM CHLORIDE, POTASSIUM CHLORIDE, SODIUM LACTATE AND CALCIUM CHLORIDE 1000 ML: 600; 310; 30; 20 INJECTION, SOLUTION INTRAVENOUS at 15:54

## 2023-11-12 RX ADMIN — POTASSIUM CHLORIDE 10 MEQ: 7.46 INJECTION, SOLUTION INTRAVENOUS at 18:06

## 2023-11-12 RX ADMIN — SODIUM CHLORIDE 100 ML/HR: 9 INJECTION, SOLUTION INTRAVENOUS at 18:06

## 2023-11-12 RX ADMIN — PIPERACILLIN SODIUM AND TAZOBACTAM SODIUM 3.38 G: 3; .375 INJECTION, POWDER, LYOPHILIZED, FOR SOLUTION INTRAVENOUS at 15:56

## 2023-11-12 RX ADMIN — FAMOTIDINE 20 MG: 10 INJECTION INTRAVENOUS at 13:31

## 2023-11-12 RX ADMIN — ALBUTEROL SULFATE 1.25 MG: 1.25 SOLUTION RESPIRATORY (INHALATION) at 18:09

## 2023-11-12 RX ADMIN — MORPHINE SULFATE 4 MG: 4 INJECTION, SOLUTION INTRAMUSCULAR; INTRAVENOUS at 15:24

## 2023-11-12 NOTE — ED PROVIDER NOTES
Subjective   History of Present Illness  77-year-old male presents to the emergency department with complaint of abdominal pain, nausea and vomiting.  History of hypertension, hld, asthma and anxiety, cognitive impairment.  History provided mainly by the sister who is present at the bedside.  Sister states that patient has had generalized weakness, nausea and vomiting since yesterday.  Has had several episodes of nonbloody nonbilious emesis.  Has been complaining of abdominal pain.  No reports of diarrhea.  Sister states that patient slept all day yesterday which is unusual.  Last night noticed he had some difficulty maintaining a normal balance while ambulating.  Last time he was able to walk normally was 2 days ago.  No reports of facial asymmetry, slurred speech, unilateral numbness or weakness.  Patient complains of abdominal pain and nausea.  Unable to obtain additional information.    History provided by:  Patient and relative      Review of Systems   All other systems reviewed and are negative.      Past Medical History:   Diagnosis Date    Anxiety     Hypertension        No Known Allergies    Past Surgical History:   Procedure Laterality Date    PROSTATE SURGERY         Family History   Problem Relation Age of Onset    Diabetes Mother     Heart disease Mother     Kidney disease Mother     Cancer Father     Asthma Brother     Cancer Brother        Social History     Socioeconomic History    Marital status: Single   Tobacco Use    Smoking status: Never    Smokeless tobacco: Never   Substance and Sexual Activity    Alcohol use: Never    Drug use: Never    Sexual activity: Defer           Objective   Physical Exam  Vitals and nursing note reviewed.   Constitutional:       General: He is not in acute distress.     Appearance: He is normal weight.   HENT:      Head: Normocephalic and atraumatic.      Nose: Nose normal. No congestion or rhinorrhea.      Mouth/Throat:      Mouth: Mucous membranes are moist.       Pharynx: No oropharyngeal exudate or posterior oropharyngeal erythema.   Eyes:      Extraocular Movements: Extraocular movements intact.      Conjunctiva/sclera: Conjunctivae normal.      Pupils: Pupils are equal, round, and reactive to light.   Cardiovascular:      Rate and Rhythm: Normal rate and regular rhythm.      Heart sounds: Normal heart sounds. No murmur heard.  Pulmonary:      Effort: Pulmonary effort is normal.      Breath sounds: Normal breath sounds. No wheezing, rhonchi or rales.   Abdominal:      Tenderness: There is abdominal tenderness. There is guarding. There is no rebound.      Comments: Tenderness and voluntary guarding in the right upper and right lower quadrant   Musculoskeletal:         General: No swelling or tenderness.   Skin:     General: Skin is warm and dry.      Capillary Refill: Capillary refill takes less than 2 seconds.   Neurological:      General: No focal deficit present.      Mental Status: He is alert and oriented to person, place, and time. Mental status is at baseline.         Procedures       Lab Results (last 24 hours)       Procedure Component Value Units Date/Time    CBC & Differential [[id number]]  (Abnormal) Collected: 11/12/23 1326    Specimen: Blood Updated: 11/12/23 7089    Narrative:      The following orders were created for panel order CBC & Differential.  Procedure                               Abnormality         Status                     ---------                               -----------         ------                     CBC Auto Differential[[id number]]        Abnormal            Final result                 Please view results for these tests on the individual orders.    Comprehensive Metabolic Panel [[id number]]  (Abnormal) Collected: 11/12/23 1326    Specimen: Blood Updated: 11/12/23 1357     Glucose 161 mg/dL      BUN 45 mg/dL      Creatinine 2.78 mg/dL      Sodium 135 mmol/L      Potassium 3.2 mmol/L      Comment: Slight hemolysis detected by analyzer.  Result may be falsely elevated.        Chloride 97 mmol/L      CO2 24.0 mmol/L      Calcium 9.2 mg/dL      Total Protein 7.4 g/dL      Albumin 3.7 g/dL      ALT (SGPT) 17 U/L      AST (SGOT) 23 U/L      Alkaline Phosphatase 72 U/L      Total Bilirubin 1.1 mg/dL      Globulin 3.7 gm/dL      A/G Ratio 1.0 g/dL      BUN/Creatinine Ratio 16.2     Anion Gap 14.0 mmol/L      eGFR 22.6 mL/min/1.73     Narrative:      GFR Normal >60  Chronic Kidney Disease <60  Kidney Failure <15    The GFR formula is only valid for adults with stable renal function between ages 18 and 70.    Protime-INR [[id number]]  (Abnormal) Collected: 11/12/23 1326    Specimen: Blood Updated: 11/12/23 1348     Protime 15.4 Seconds      INR 1.20    Lipase [[id number]]  (Normal) Collected: 11/12/23 1326    Specimen: Blood Updated: 11/12/23 1352     Lipase 24 U/L     Magnesium [[id number]]  (Normal) Collected: 11/12/23 1326    Specimen: Blood Updated: 11/12/23 1353     Magnesium 2.2 mg/dL     CBC Auto Differential [[id number]]  (Abnormal) Collected: 11/12/23 1326    Specimen: Blood Updated: 11/12/23 1339     WBC 21.49 10*3/mm3      RBC 4.53 10*6/mm3      Hemoglobin 14.4 g/dL      Hematocrit 42.9 %      MCV 94.7 fL      MCH 31.8 pg      MCHC 33.6 g/dL      RDW 13.0 %      RDW-SD 44.9 fl      MPV 10.2 fL      Platelets 197 10*3/mm3      Neutrophil % 86.9 %      Lymphocyte % 3.3 %      Monocyte % 8.9 %      Eosinophil % 0.0 %      Basophil % 0.2 %      Immature Grans % 0.7 %      Neutrophils, Absolute 18.65 10*3/mm3      Lymphocytes, Absolute 0.71 10*3/mm3      Monocytes, Absolute 1.92 10*3/mm3      Eosinophils, Absolute 0.01 10*3/mm3      Basophils, Absolute 0.04 10*3/mm3      Immature Grans, Absolute 0.16 10*3/mm3      nRBC 0.0 /100 WBC     Blood Culture - Blood, Arm, Left [[id number]] Collected: 11/12/23 1547    Specimen: Blood from Arm, Left Updated: 11/12/23 1558    Blood Culture - Blood, Arm, Right [[id number]] Collected: 11/12/23 1547    Specimen: Blood  from Arm, Right Updated: 11/12/23 1559    Lactic Acid, Plasma [[id number]]  (Abnormal) Collected: 11/12/23 1547    Specimen: Blood Updated: 11/12/23 1613     Lactate 2.3 mmol/L          CT Abdomen Pelvis Without Contrast    Result Date: 11/12/2023  EXAM: CT ABDOMEN PELVIS WO CONTRAST-  INDICATION: abd pain, n/v, gfr less than 30    TECHNIQUE: Helically acquired CT images were obtained of the abdomen and pelvis without intravenous contrast. Coronal and sagittal reformations were performed.    DOSE LENGTH PRODUCT: 197 mGy cm. Automated exposure control was also utilized to decrease patient radiation dose.  COMPARISON: Non available.  FINDINGS:  Lower Chest: Trace pericardial fluid. Mild atelectasis in the right lung base  Liver: Unremarkable.  Biliary Tree: Gallbladder is distended with mild gallbladder wall thickening and pericholecystic stranding.  Spleen: Unremarkable.  Pancreas: Unremarkable.  Adrenal Glands: Unremarkable.  Kidneys/Ureters/Bladder: Unremarkable.  Reproductive Organs: Unremarkable.  Gastrointestinal Tract: Colonic diverticulosis. Stranding about the hepatic flexure of the large bowel, likely reactive. Appendix not visualized, however no secondary signs of acute appendicitis.  Lymphatics: Unremarkable.  Vasculature: Mild to moderate atherosclerosis.  Peritoneum/Retroperitoneum: No additional findings  Abdominal Wall/Soft Tissues: Small fat-containing left inguinal hernia.  Osseous Structures: No acute or suspicious osseous findings.          Findings suspicious for acute cholecystitis demonstrated by distended gallbladder, mild gallbladder wall thickening, and pericholecystic stranding.   This report was signed and finalized on 11/12/2023 3:17 PM CST by Selvin Zambrano.      XR Chest 1 View    Result Date: 11/12/2023  XR CHEST 1 VW- 11/12/2023 1:28 PM CST  HISTORY: ams   COMPARISON: None  FINDINGS: Upright frontal radiograph of the chest was obtained  Airspace disease in the right lung base with  obscuration of the right hemidiaphragm. The cardiomediastinal silhouette and pulmonary vascularity are within normal limits. The osseous structures and surrounding soft tissues demonstrate no acute abnormality.      1.  Right lower lobe airspace disease which obscures the right hemidiaphragm, differential including pneumonia, atelectasis or perhaps parenchymal scarring.  This report was signed and finalized on 11/12/2023 2:21 PM CST by Dr Rj Nuñez.        ED Course  ED Course as of 11/12/23 1613   Sun Nov 12, 2023   1603 77-year-old male with history of hypertension, hyperlipidemia, asthma, anxiety, felt mental delay who presents to the ED with 2-day history of abdominal pain, nausea, vomiting, decreased appetite, generalized weakness.  No fever in the ED.  White count 21,000 lipase normal, lactate pending..  Tender in the right upper quadrant right lower quadrant on exam.  CT ordered, reveals findings consistent with acute cholecystitis.  Received dose of Zosyn.  Chest x-ray ordered for preop purposes, revealed over past in the right lower lung field.  Likely atelectasis as this was seen on CT.  Doubt pneumonia as patient does not have any cough, congestion, shortness of breath hypoxia.    Blood cultures and lactate sent.  Ordered 2 L of normal saline.  Zosyn ordered.    Also has flower, likely from dehydration. Will continue hydration.  Spoke to general surgery, Dr. Roman.  Given patient's comorbidities and acute kidney injury request admit to the hospital service.  Plan for cholecystectomy tomorrow.  Case discussed with hospitalist who have graciously excepted the patient for admission.   [AW]      ED Course User Index  [AW] Inocente Rudd MD                                           Medical Decision Making  Amount and/or Complexity of Data Reviewed  Labs: ordered.  Radiology: ordered.    Risk  Prescription drug management.        Final diagnoses:   Acute cholecystitis   Acute kidney injury       ED  Disposition  ED Disposition       ED Disposition   Decision to Admit    Condition   --    Comment   Level of Care: Med/Surg [1]   Diagnosis: Acute cholecystitis [575.0.ICD-9-CM]   Admitting Physician: KITA FRAGA [[id number]]   Attending Physician: KITA FRAGA [[id number]]   Certification: I Certify That Inpatient Hospital Services Are Medically Necessary For Greater Than 2 Midnights                 No follow-up provider specified.       Medication List      No changes were made to your prescriptions during this visit.            Inocente Rudd MD  11/12/23 6334

## 2023-11-12 NOTE — H&P
[facility] Medicine Services  HISTORY AND PHYSICAL    Date of Admission: 11/12/2023  Primary Care Physician: Matt Garcia,     Subjective   Primary Historian: Patient and sister    Chief Complaint: abdominal pain, nausea and vomiting since yesterday    Vomiting       77 years old man with a history of asthma, never smoker, history of hypertension, GERD, dyslipidemia, prostate cancer posttreatment in 2015, came to ER with his sister complaining of right upper quadrant pain and periumbilical pain started yesterday, associated with mild abdominal distention, nausea and vomiting.  The sister noticed the patient has generalized weakness and unsteady gait.  No chills or fever, no diarrhea or constipation.  In ER the patient was found dehydrated, with CHARLEEN and slightly lactic acidosis, leukocytosis and neutrophilia, CT abdomen and pelvis showing suspected acute cholecystitis.  General surgery was consulted, will see the patient for possible cholecystectomy, recommended admission under hospitalist service.  In ER he was treated with 3 L LR fluid bolus for sepsis, started on antibiotic treatment with Zosyn, blood cultures were sent, did also with morphine and Zofran.  I examined the patient in the emergency room.  Currently feeling better, less abdominal pain after IV morphine.  Discussed with her sister present at bedside.        Review of Systems   Constitutional:  Positive for activity change.   HENT: Negative.     Eyes: Negative.    Respiratory: Negative.     Cardiovascular: Negative.    Gastrointestinal:  Positive for abdominal distention, nausea and vomiting.        Right upper quadrant pain and periumbilical pain since yesterday, associated with nausea and vomiting   Endocrine: Negative.    Genitourinary: Negative.    Musculoskeletal:  Positive for gait problem.        Unsteady gait for the last 2 days   Skin: Negative.    Allergic/Immunologic: Negative.    Hematological:  Negative.    Psychiatric/Behavioral: Negative.        Otherwise complete ROS reviewed and negative except as mentioned in the HPI.    Past Medical History:   Past Medical History:   Diagnosis Date    Anxiety     Hypertension      Past Surgical History:  Past Surgical History:   Procedure Laterality Date    PROSTATE SURGERY       Social History:  reports that he has never smoked. He has never used smokeless tobacco. He reports that he does not drink alcohol and does not use drugs.    Family History: family history includes Asthma in his brother; Cancer in his brother and father; Diabetes in his mother; Heart disease in his mother; Kidney disease in his mother.       Allergies:  No Known Allergies    Medications:  Prior to Admission medications    Medication Sig Start Date End Date Taking? Authorizing Provider   albuterol (ACCUNEB) 1.25 MG/3ML nebulizer solution Take 3 mL by nebulization Every 6 (Six) Hours As Needed for Wheezing. 5/11/21   Nya Germain APRN   albuterol sulfate  (90 Base) MCG/ACT inhaler Inhale 2 puffs Every 4 (Four) Hours As Needed for Wheezing. 2/4/21   DENNIS Caputo MD   aspirin 81 MG EC tablet Take 1 tablet by mouth Daily. 6/30/20   DENNIS Caputo MD   benzonatate (TESSALON) 200 MG capsule Take 1 capsule by mouth 3 (Three) Times a Day As Needed for Cough. 12/30/22   DENNIS Caputo MD   budesonide (PULMICORT) 0.5 MG/2ML nebulizer solution Take 2 mL by nebulization Daily. 3/7/22   DENNIS Caputo MD   cetirizine (zyrTEC) 10 MG tablet Take 1 tablet by mouth Daily. 6/25/21   DENNIS Caputo MD   cholecalciferol (VITAMIN D3) 25 MCG (1000 UT) tablet Take 1 tablet by mouth Daily. 6/30/20   DENNIS Caputo MD   fluticasone-salmeterol (Advair HFA) 115-21 MCG/ACT inhaler Inhale 2 puffs 2 (Two) Times a Day. 12/30/22   DENNIS Caputo MD   guaiFENesin (Mucinex) 600 MG 12 hr tablet Take 2 tablets by mouth 2 (Two) Times a Day. Take with full glass of water. 10/11/22    "Nya Germain APRN   hydroCHLOROthiazide (MICROZIDE) 12.5 MG capsule Take 1 capsule by mouth Every Morning. 7/3/23   DENNIS Caputo MD   ipratropium-albuterol (DUO-NEB) 0.5-2.5 mg/3 ml nebulizer USE 1 VIAL IN NEBULIZER EVERY 6 HOURS 4/10/23   DENNIS Caputo MD   losartan (COZAAR) 50 MG tablet Take 1 tablet by mouth Daily. 7/3/23   DENNIS Caputo MD   montelukast (SINGULAIR) 10 MG tablet Take 1 tablet by mouth Every Night. 6/30/20   DENNIS Caputo MD   pantoprazole (PROTONIX) 40 MG EC tablet Take 1 tablet by mouth Daily. 7/3/23   DENNIS Caputo MD   simvastatin (ZOCOR) 20 MG tablet Take 1 tablet by mouth Every Night. 8/4/23   DENNIS Caputo MD   tamsulosin (FLOMAX) 0.4 MG capsule 24 hr capsule Take 1 capsule by mouth Daily. 7/3/23   DENNIS Caputo MD   tiotropium bromide monohydrate (Spiriva Respimat) 2.5 MCG/ACT aerosol solution inhaler Inhale 2 puffs Daily. 12/30/22   DENNIS Caputo MD   vitamin C (ASCORBIC ACID) 500 MG tablet Take 1 tablet by mouth Daily. 6/30/20   DENNIS Caputo MD     I have utilized all available immediate resources to obtain, update, or review the patient's current medications (including all prescriptions, over-the-counter products, herbals, cannabis/cannabidiol products, and vitamin/mineral/dietary (nutritional) supplements).    Objective     Vital Signs: /79   Pulse 101   Temp 98.3 °F (36.8 °C) (Oral)   Resp 18   Ht 177.8 cm (70\")   Wt 71.8 kg (158 lb 3.2 oz)   SpO2 93%   BMI 22.70 kg/m²   Physical Exam  Vitals reviewed.   Constitutional:       Appearance: He is normal weight.   HENT:      Head: Normocephalic and atraumatic.      Right Ear: External ear normal.      Left Ear: External ear normal.      Nose: Nose normal.      Mouth/Throat:      Mouth: Mucous membranes are dry.      Pharynx: Oropharynx is clear.      Comments: Dry mucous membrane  Poor dentition  Eyes:      Extraocular Movements: Extraocular movements intact.      " Conjunctiva/sclera: Conjunctivae normal.      Pupils: Pupils are equal, round, and reactive to light.   Cardiovascular:      Rate and Rhythm: Normal rate and regular rhythm.      Pulses: Normal pulses.      Heart sounds: Normal heart sounds.   Pulmonary:      Effort: Pulmonary effort is normal.      Breath sounds: Normal breath sounds.   Abdominal:      General: Bowel sounds are normal.      Palpations: Abdomen is soft.      Tenderness: There is abdominal tenderness.      Comments: Tenderness to deep palpation in RUQ   Musculoskeletal:         General: Normal range of motion.      Cervical back: Normal range of motion and neck supple.   Skin:     General: Skin is warm and dry.      Capillary Refill: Capillary refill takes less than 2 seconds.   Neurological:      General: No focal deficit present.      Mental Status: He is alert and oriented to person, place, and time. Mental status is at baseline.   Psychiatric:         Mood and Affect: Mood normal.         Behavior: Behavior normal.         Thought Content: Thought content normal.         Judgment: Judgment normal.              Results Reviewed:  Lab Results (last 24 hours)       Procedure Component Value Units Date/Time    Lactic Acid, Plasma [[id number]]  (Abnormal) Collected: 11/12/23 1547    Specimen: Blood Updated: 11/12/23 1613     Lactate 2.3 mmol/L     Blood Culture - Blood, Arm, Right [[id number]] Collected: 11/12/23 1547    Specimen: Blood from Arm, Right Updated: 11/12/23 1559    Blood Culture - Blood, Arm, Left [[id number]] Collected: 11/12/23 1547    Specimen: Blood from Arm, Left Updated: 11/12/23 1558    Comprehensive Metabolic Panel [[id number]]  (Abnormal) Collected: 11/12/23 1326    Specimen: Blood Updated: 11/12/23 1357     Glucose 161 mg/dL      BUN 45 mg/dL      Creatinine 2.78 mg/dL      Sodium 135 mmol/L      Potassium 3.2 mmol/L      Comment: Slight hemolysis detected by analyzer. Result may be falsely elevated.        Chloride 97 mmol/L       CO2 24.0 mmol/L      Calcium 9.2 mg/dL      Total Protein 7.4 g/dL      Albumin 3.7 g/dL      ALT (SGPT) 17 U/L      AST (SGOT) 23 U/L      Alkaline Phosphatase 72 U/L      Total Bilirubin 1.1 mg/dL      Globulin 3.7 gm/dL      A/G Ratio 1.0 g/dL      BUN/Creatinine Ratio 16.2     Anion Gap 14.0 mmol/L      eGFR 22.6 mL/min/1.73     Narrative:      GFR Normal >60  Chronic Kidney Disease <60  Kidney Failure <15    The GFR formula is only valid for adults with stable renal function between ages 18 and 70.    Magnesium [[id number]]  (Normal) Collected: 11/12/23 1326    Specimen: Blood Updated: 11/12/23 1353     Magnesium 2.2 mg/dL     Lipase [[id number]]  (Normal) Collected: 11/12/23 1326    Specimen: Blood Updated: 11/12/23 1352     Lipase 24 U/L     Protime-INR [[id number]]  (Abnormal) Collected: 11/12/23 1326    Specimen: Blood Updated: 11/12/23 1348     Protime 15.4 Seconds      INR 1.20    CBC & Differential [[id number]]  (Abnormal) Collected: 11/12/23 1326    Specimen: Blood Updated: 11/12/23 1339    Narrative:      The following orders were created for panel order CBC & Differential.  Procedure                               Abnormality         Status                     ---------                               -----------         ------                     CBC Auto Differential[[id number]]        Abnormal            Final result                 Please view results for these tests on the individual orders.    CBC Auto Differential [[id number]]  (Abnormal) Collected: 11/12/23 1326    Specimen: Blood Updated: 11/12/23 1339     WBC 21.49 10*3/mm3      RBC 4.53 10*6/mm3      Hemoglobin 14.4 g/dL      Hematocrit 42.9 %      MCV 94.7 fL      MCH 31.8 pg      MCHC 33.6 g/dL      RDW 13.0 %      RDW-SD 44.9 fl      MPV 10.2 fL      Platelets 197 10*3/mm3      Neutrophil % 86.9 %      Lymphocyte % 3.3 %      Monocyte % 8.9 %      Eosinophil % 0.0 %      Basophil % 0.2 %      Immature Grans % 0.7 %      Neutrophils, Absolute  18.65 10*3/mm3      Lymphocytes, Absolute 0.71 10*3/mm3      Monocytes, Absolute 1.92 10*3/mm3      Eosinophils, Absolute 0.01 10*3/mm3      Basophils, Absolute 0.04 10*3/mm3      Immature Grans, Absolute 0.16 10*3/mm3      nRBC 0.0 /100 WBC           Imaging Results (Last 24 Hours)       Procedure Component Value Units Date/Time    CT Abdomen Pelvis Without Contrast [[id number]] Collected: 11/12/23 1509     Updated: 11/12/23 1520    Narrative:      EXAM: CT ABDOMEN PELVIS WO CONTRAST-     INDICATION: abd pain, n/v, gfr less than 30        TECHNIQUE: Helically acquired CT images were obtained of the abdomen and  pelvis without intravenous contrast. Coronal and sagittal reformations  were performed.        DOSE LENGTH PRODUCT: 197 mGy cm. Automated exposure control was also  utilized to decrease patient radiation dose.     COMPARISON: Non available.     FINDINGS:     Lower Chest: Trace pericardial fluid. Mild atelectasis in the right lung  base     Liver: Unremarkable.     Biliary Tree: Gallbladder is distended with mild gallbladder wall  thickening and pericholecystic stranding.     Spleen: Unremarkable.     Pancreas: Unremarkable.     Adrenal Glands: Unremarkable.     Kidneys/Ureters/Bladder: Unremarkable.     Reproductive Organs: Unremarkable.     Gastrointestinal Tract: Colonic diverticulosis. Stranding about the  hepatic flexure of the large bowel, likely reactive. Appendix not  visualized, however no secondary signs of acute appendicitis.     Lymphatics: Unremarkable.     Vasculature: Mild to moderate atherosclerosis.     Peritoneum/Retroperitoneum: No additional findings     Abdominal Wall/Soft Tissues: Small fat-containing left inguinal hernia.     Osseous Structures: No acute or suspicious osseous findings.       Impression:            Findings suspicious for acute cholecystitis demonstrated by distended  gallbladder, mild gallbladder wall thickening, and pericholecystic  stranding.        This report was  signed and finalized on 11/12/2023 3:17 PM CST by Selvin Zambrano.       XR Chest 1 View [[id number]] Collected: 11/12/23 1418     Updated: 11/12/23 1424    Narrative:      XR CHEST 1 VW- 11/12/2023 1:28 PM CST     HISTORY: ams       COMPARISON: None     FINDINGS:  Upright frontal radiograph of the chest was obtained     Airspace disease in the right lung base with obscuration of the right  hemidiaphragm. The cardiomediastinal silhouette and pulmonary  vascularity are within normal limits. The osseous structures and  surrounding soft tissues demonstrate no acute abnormality.       Impression:      1.  Right lower lobe airspace disease which obscures the right  hemidiaphragm, differential including pneumonia, atelectasis or perhaps  parenchymal scarring.     This report was signed and finalized on 11/12/2023 2:21 PM CST by Dr Rj Nuñez.             I have personally reviewed and interpreted the radiology studies and ECG obtained at time of admission.     Assessment / Plan   Assessment:   Active Hospital Problems    Diagnosis     **Acute cholecystitis        Treatment Plan  The patient will be admitted to my service here at [facility].    -Acute cholecystitis with severe sepsis present at admission with endorgan dysfunction-CHARLEEN.  Right upper quadrant pain for 2 days associated with nausea and vomiting.  Slightly sinus tachycardia, leukocytosis with neutrophilia, mildly elevated lactic acid and CHARLEEN.    Treated with 3 L LR fluid bolus in ER and started on antibiotic treatment with Zosyn.  Blood cultures were sent.  General surgery was consulted    We will keep the patient n.p.o. postmidnight  Continue antibiotic treatment with Zosyn  Medication and antiemetics as needed  Repeat lactic acid until back to normal    The patient does not have a history of coronary artery disease or congestive heart failure, no chest pain or shortness of breath.  Oxygen saturation within normal limits on room air.  The  patient is medical cleared for surgery, low risk for cardiovascular complications periop.    -CHARLEEN-prerenal secondary to dehydration poor p.o. intake versus ATN  Post liters LR fluid bolus in ER.  Monitor kidney function and urine output  Avoid nephrotoxic drugs  Continue with IV fluids with normal saline at 100 cc/h.  IV fluid bolus as needed for hypotension    -Hypokalemia-replacement    - h/o HTN.  Secondary to CHARLEEN and sepsis hold hydrochlorothiazide and Cozaar    -Dyslipidemia-on statin    -GERD, on PPI prior to admission. Started on Famotidine IV.    -History of asthma, persistent, without exacerbation, currently no wheezing or cough.  Oxygen saturation within normal limits on room air  Bronchodilators as needed  Pulmonary toilet and incentive spirometry    -History of prostate cancer posttreatment in 2015.  On Flomax    -Mild cognitive impairment.  The patient is very pleasant.  Lives with his sister  Supportive care    -Generalized weakness.  If not improving with hydration and antibiotic treatment may need PT and OT evaluation.    -History of prediabetes with last hemoglobin A1c 6.3.  Follow-up with PCP.    Discharge plan-likely return home with his sister when medical stable        Medical Decision Making  Number and Complexity of problems: 3      Conditions and Status        Stabilized in ER     MDM Data  External documents reviewed: yes  Cardiac tracing (EKG, telemetry) interpretation: yes  Radiology interpretation: yes  Labs reviewed: yes       Discussed with: one sister present at bedside, one sister over the phone     Care Planning  Shared decision making: yes  Code status and discussions: full code    Disposition  Social Determinants of Health that impact treatment or disposition: yes  Estimated length of stay is 3 days.     I confirmed that the patient's advanced care plan is present, code status is documented, and a surrogate decision maker is listed in the patient's medical record.     The patient's  surrogate decision maker is sister.     The patient was seen and examined by me on 11/12/2023 at 4:10 PM.    Electronically signed by Elizabeth Chapa MD, 11/12/23, 16:43 CST.

## 2023-11-12 NOTE — ED NOTES
Nursing report ED to floor  Abhishek Mendieta  77 y.o.  male    HPI:   Chief Complaint   Patient presents with    Vomiting       Admitting doctor:   Elizabeth Chapa MD    Consulting provider(s):  Consults       No orders found from 10/14/2023 to 11/13/2023.             Admitting diagnosis:   The primary encounter diagnosis was Acute cholecystitis. A diagnosis of Acute kidney injury was also pertinent to this visit.    Code status:   Current Code Status       Date Active Code Status Order ID Comments User Context       Not on file            Allergies:   Patient has no known allergies.    Intake and Output    Intake/Output Summary (Last 24 hours) at 11/12/2023 1626  Last data filed at 11/12/2023 1357  Gross per 24 hour   Intake 1000 ml   Output --   Net 1000 ml       Weight:       11/12/23  1247   Weight: 71.8 kg (158 lb 3.2 oz)       Most recent vitals:   Vitals:    11/12/23 1512 11/12/23 1559 11/12/23 1613 11/12/23 1616   BP:  123/65  136/79   Pulse: 108 102 98 101   Resp:       Temp:       TempSrc:       SpO2: 98% 96% 95% 93%   Weight:       Height:         Oxygen Therapy: .    Active LDAs/IV Access:   Lines, Drains & Airways       Active LDAs       Name Placement date Placement time Site Days    Peripheral IV 11/12/23 1327 Left Antecubital 11/12/23  1327  Antecubital  less than 1    Peripheral IV 11/12/23 1555 Posterior;Right Hand 11/12/23  1555  Hand  less than 1                    Labs (abnormal labs have a star):   Labs Reviewed   COMPREHENSIVE METABOLIC PANEL - Abnormal; Notable for the following components:       Result Value    Glucose 161 (*)     BUN 45 (*)     Creatinine 2.78 (*)     Sodium 135 (*)     Potassium 3.2 (*)     Chloride 97 (*)     eGFR 22.6 (*)     All other components within normal limits    Narrative:     GFR Normal >60  Chronic Kidney Disease <60  Kidney Failure <15    The GFR formula is only valid for adults with stable renal function between ages 18 and 70.   PROTIME-INR - Abnormal;  Notable for the following components:    Protime 15.4 (*)     INR 1.20 (*)     All other components within normal limits   CBC WITH AUTO DIFFERENTIAL - Abnormal; Notable for the following components:    WBC 21.49 (*)     Neutrophil % 86.9 (*)     Lymphocyte % 3.3 (*)     Eosinophil % 0.0 (*)     Immature Grans % 0.7 (*)     Neutrophils, Absolute 18.65 (*)     Monocytes, Absolute 1.92 (*)     Immature Grans, Absolute 0.16 (*)     All other components within normal limits   LACTIC ACID, PLASMA - Abnormal; Notable for the following components:    Lactate 2.3 (*)     All other components within normal limits   LIPASE - Normal   MAGNESIUM - Normal   BLOOD CULTURE   BLOOD CULTURE   LACTIC ACID, REFLEX   CBC AND DIFFERENTIAL    Narrative:     The following orders were created for panel order CBC & Differential.  Procedure                               Abnormality         Status                     ---------                               -----------         ------                     CBC Auto Differential[[id number]]        Abnormal            Final result                 Please view results for these tests on the individual orders.       Meds given in ED:   Medications   sodium chloride 0.9 % flush 10 mL (has no administration in time range)   ondansetron (ZOFRAN) injection 4 mg (4 mg Intravenous Given 11/12/23 1329)   famotidine (PEPCID) injection 20 mg (20 mg Intravenous Given 11/12/23 1331)   lactated ringers bolus 1,000 mL (0 mL Intravenous Stopped 11/12/23 1357)   lactated ringers bolus 1,000 mL (1,000 mL Intravenous New Bag 11/12/23 1522)   morphine injection 4 mg (4 mg Intravenous Given 11/12/23 1524)   piperacillin-tazobactam (ZOSYN) IVPB 3.375 g in 100 mL NS (CD) (3.375 g Intravenous New Bag 11/12/23 1556)   lactated ringers bolus 1,000 mL (1,000 mL Intravenous New Bag 11/12/23 1554)           NIH Stroke Scale:       Isolation/Infection(s):  No active isolations   No active infections     COVID Testing  Collected  .  Resulted .    Nursing report ED to floor:  Mental status: A & O x1  Ambulatory status: Assist x1  Precautions: .    ED nurse phone extentsion- ..

## 2023-11-12 NOTE — Clinical Note
Level of Care: Med/Surg [1]   Diagnosis: Acute cholecystitis [575.0.ICD-9-CM]   Admitting Physician: KITA FRAGA [[id number]]   Attending Physician: KITA FRAGA [[id number]]   Certification: I Certify That Inpatient Hospital Services Are Medically Necessary For Greater Than 2 Midnights

## 2023-11-13 ENCOUNTER — ANESTHESIA EVENT (OUTPATIENT)
Dept: PERIOP | Facility: HOSPITAL | Age: 78
End: 2023-11-13
Payer: MEDICARE

## 2023-11-13 ENCOUNTER — ANESTHESIA (OUTPATIENT)
Dept: PERIOP | Facility: HOSPITAL | Age: 78
End: 2023-11-13
Payer: MEDICARE

## 2023-11-13 LAB
ABO GROUP BLD: NORMAL
ALBUMIN SERPL-MCNC: 3.2 G/DL (ref 3.5–5.2)
ALBUMIN/GLOB SERPL: 1 G/DL
ALP SERPL-CCNC: 83 U/L (ref 39–117)
ALT SERPL W P-5'-P-CCNC: 27 U/L (ref 1–41)
ANION GAP SERPL CALCULATED.3IONS-SCNC: 12 MMOL/L (ref 5–15)
AST SERPL-CCNC: 36 U/L (ref 1–40)
BASOPHILS # BLD AUTO: 0.02 10*3/MM3 (ref 0–0.2)
BASOPHILS NFR BLD AUTO: 0.1 % (ref 0–1.5)
BILIRUB SERPL-MCNC: 1.2 MG/DL (ref 0–1.2)
BLD GP AB SCN SERPL QL: NEGATIVE
BUN SERPL-MCNC: 31 MG/DL (ref 8–23)
BUN/CREAT SERPL: 20 (ref 7–25)
CALCIUM SPEC-SCNC: 8.3 MG/DL (ref 8.6–10.5)
CHLORIDE SERPL-SCNC: 101 MMOL/L (ref 98–107)
CO2 SERPL-SCNC: 23 MMOL/L (ref 22–29)
CREAT SERPL-MCNC: 1.55 MG/DL (ref 0.76–1.27)
DEPRECATED RDW RBC AUTO: 44 FL (ref 37–54)
EGFRCR SERPLBLD CKD-EPI 2021: 45.5 ML/MIN/1.73
EOSINOPHIL # BLD AUTO: 0 10*3/MM3 (ref 0–0.4)
EOSINOPHIL NFR BLD AUTO: 0 % (ref 0.3–6.2)
ERYTHROCYTE [DISTWIDTH] IN BLOOD BY AUTOMATED COUNT: 12.7 % (ref 12.3–15.4)
GLOBULIN UR ELPH-MCNC: 3.1 GM/DL
GLUCOSE SERPL-MCNC: 159 MG/DL (ref 65–99)
HCT VFR BLD AUTO: 37.5 % (ref 37.5–51)
HGB BLD-MCNC: 12.3 G/DL (ref 13–17.7)
IMM GRANULOCYTES # BLD AUTO: 0.18 10*3/MM3 (ref 0–0.05)
IMM GRANULOCYTES NFR BLD AUTO: 1 % (ref 0–0.5)
LYMPHOCYTES # BLD AUTO: 0.32 10*3/MM3 (ref 0.7–3.1)
LYMPHOCYTES NFR BLD AUTO: 1.9 % (ref 19.6–45.3)
MAGNESIUM SERPL-MCNC: 2 MG/DL (ref 1.6–2.4)
MCH RBC QN AUTO: 31 PG (ref 26.6–33)
MCHC RBC AUTO-ENTMCNC: 32.8 G/DL (ref 31.5–35.7)
MCV RBC AUTO: 94.5 FL (ref 79–97)
MONOCYTES # BLD AUTO: 0.68 10*3/MM3 (ref 0.1–0.9)
MONOCYTES NFR BLD AUTO: 4 % (ref 5–12)
NEUTROPHILS NFR BLD AUTO: 15.99 10*3/MM3 (ref 1.7–7)
NEUTROPHILS NFR BLD AUTO: 93 % (ref 42.7–76)
NRBC BLD AUTO-RTO: 0 /100 WBC (ref 0–0.2)
PLATELET # BLD AUTO: 186 10*3/MM3 (ref 140–450)
PMV BLD AUTO: 10.3 FL (ref 6–12)
POTASSIUM SERPL-SCNC: 3.3 MMOL/L (ref 3.5–5.2)
PROT SERPL-MCNC: 6.3 G/DL (ref 6–8.5)
RBC # BLD AUTO: 3.97 10*6/MM3 (ref 4.14–5.8)
RH BLD: POSITIVE
SODIUM SERPL-SCNC: 136 MMOL/L (ref 136–145)
T&S EXPIRATION DATE: NORMAL
WBC NRBC COR # BLD: 17.19 10*3/MM3 (ref 3.4–10.8)

## 2023-11-13 PROCEDURE — 86901 BLOOD TYPING SEROLOGIC RH(D): CPT | Performed by: SURGERY

## 2023-11-13 PROCEDURE — 25010000002 HYDROMORPHONE 1 MG/ML SOLUTION

## 2023-11-13 PROCEDURE — 25010000002 PROPOFOL 10 MG/ML EMULSION

## 2023-11-13 PROCEDURE — 25010000002 ONDANSETRON PER 1 MG

## 2023-11-13 PROCEDURE — 25010000002 PIPERACILLIN SOD-TAZOBACTAM PER 1 G: Performed by: HOSPITALIST

## 2023-11-13 PROCEDURE — 85025 COMPLETE CBC W/AUTO DIFF WBC: CPT | Performed by: HOSPITALIST

## 2023-11-13 PROCEDURE — 80053 COMPREHEN METABOLIC PANEL: CPT | Performed by: HOSPITALIST

## 2023-11-13 PROCEDURE — 25010000002 BUPIVACAINE (PF) 0.25 % SOLUTION 10 ML VIAL: Performed by: SURGERY

## 2023-11-13 PROCEDURE — 0FT44ZZ RESECTION OF GALLBLADDER, PERCUTANEOUS ENDOSCOPIC APPROACH: ICD-10-PCS | Performed by: SURGERY

## 2023-11-13 PROCEDURE — 25010000002 DEXAMETHASONE PER 1 MG

## 2023-11-13 PROCEDURE — 25810000003 LACTATED RINGERS PER 1000 ML: Performed by: ANESTHESIOLOGY

## 2023-11-13 PROCEDURE — 25810000003 SODIUM CHLORIDE PER 500 ML: Performed by: SURGERY

## 2023-11-13 PROCEDURE — 25010000002 FENTANYL CITRATE (PF) 100 MCG/2ML SOLUTION

## 2023-11-13 PROCEDURE — 94799 UNLISTED PULMONARY SVC/PX: CPT

## 2023-11-13 PROCEDURE — 86900 BLOOD TYPING SEROLOGIC ABO: CPT | Performed by: SURGERY

## 2023-11-13 PROCEDURE — 83735 ASSAY OF MAGNESIUM: CPT | Performed by: HOSPITALIST

## 2023-11-13 PROCEDURE — 94664 DEMO&/EVAL PT USE INHALER: CPT

## 2023-11-13 PROCEDURE — 88304 TISSUE EXAM BY PATHOLOGIST: CPT | Performed by: SURGERY

## 2023-11-13 PROCEDURE — 25810000003 SODIUM CHLORIDE 0.9 % SOLUTION: Performed by: HOSPITALIST

## 2023-11-13 PROCEDURE — 86850 RBC ANTIBODY SCREEN: CPT | Performed by: SURGERY

## 2023-11-13 DEVICE — SEAL HEMO SURG ARISTA/AH ABS/PWDR 3GM: Type: IMPLANTABLE DEVICE | Site: ABDOMEN | Status: FUNCTIONAL

## 2023-11-13 DEVICE — LIGACLIP 10-M/L, 10MM ENDOSCOPIC ROTATING MULTIPLE CLIP APPLIERS
Type: IMPLANTABLE DEVICE | Site: ABDOMEN | Status: FUNCTIONAL
Brand: LIGACLIP

## 2023-11-13 DEVICE — THE ECHELON, ECHELON ENDOPATH™ AND ECHELON FLEX™ FAMILIES OF ENDOSCOPIC LINEAR CUTTERS AND RELOADS ARE STERILE, SINGLE PATIENT USE INSTRUMENTS THAT SIMULTANEOUSLY CUT AND STAPLE TISSUE. THERE ARE SIX STAGGERED ROWS OF STAPLES, THREE ON EITHER SIDE OF THE CUT LINE. THE 45 MM INSTRUMENTS HAVE A STAPLE LINE THATIS APPROXIMATELY 45 MM LONG AND A CUT LINE THAT IS APPROXIMATELY 42 MM LONG. THE SHAFT CAN ROTATE FREELY IN BOTH DIRECTIONS AND AN ARTICULATION MECHANISM ON ARTICULATING INSTRUMENTS ENABLES BENDING THE DISTAL PORTIONOF THE SHAFT TO FACILITATE LATERAL ACCESS OF THE OPERATIVE SITE.THE INSTRUMENTS ARE SHIPPED WITHOUT A RELOAD AND MUST BE LOADED PRIOR TO USE. A STAPLE RETAINING CAP ON THE RELOAD PROTECTS THE STAPLE LEG POINTS DURING SHIPPING AND TRANSPORTATION. THE INSTRUMENTS’ LOCK-OUT FEATURE IS DESIGNED TO PREVENT A USED RELOAD FROM BEING REFIRED.
Type: IMPLANTABLE DEVICE | Site: ABDOMEN | Status: FUNCTIONAL
Brand: ECHELON ENDOPATH

## 2023-11-13 RX ORDER — ALBUTEROL SULFATE 2.5 MG/3ML
2.5 SOLUTION RESPIRATORY (INHALATION) EVERY 6 HOURS PRN
Status: DISCONTINUED | OUTPATIENT
Start: 2023-11-13 | End: 2023-11-15 | Stop reason: HOSPADM

## 2023-11-13 RX ORDER — BUPIVACAINE HCL/0.9 % NACL/PF 0.125 %
PLASTIC BAG, INJECTION (ML) EPIDURAL AS NEEDED
Status: DISCONTINUED | OUTPATIENT
Start: 2023-11-13 | End: 2023-11-13 | Stop reason: SURG

## 2023-11-13 RX ORDER — ATORVASTATIN CALCIUM 10 MG/1
20 TABLET, FILM COATED ORAL DAILY
Status: DISCONTINUED | OUTPATIENT
Start: 2023-11-14 | End: 2023-11-15 | Stop reason: HOSPADM

## 2023-11-13 RX ORDER — CETIRIZINE HYDROCHLORIDE 10 MG/1
10 TABLET ORAL DAILY
Status: DISCONTINUED | OUTPATIENT
Start: 2023-11-14 | End: 2023-11-15 | Stop reason: HOSPADM

## 2023-11-13 RX ORDER — LIDOCAINE HYDROCHLORIDE 20 MG/ML
INJECTION, SOLUTION EPIDURAL; INFILTRATION; INTRACAUDAL; PERINEURAL AS NEEDED
Status: DISCONTINUED | OUTPATIENT
Start: 2023-11-13 | End: 2023-11-13 | Stop reason: SURG

## 2023-11-13 RX ORDER — DROPERIDOL 2.5 MG/ML
0.62 INJECTION, SOLUTION INTRAMUSCULAR; INTRAVENOUS ONCE AS NEEDED
Status: DISCONTINUED | OUTPATIENT
Start: 2023-11-13 | End: 2023-11-13 | Stop reason: HOSPADM

## 2023-11-13 RX ORDER — ROCURONIUM BROMIDE 10 MG/ML
INJECTION, SOLUTION INTRAVENOUS AS NEEDED
Status: DISCONTINUED | OUTPATIENT
Start: 2023-11-13 | End: 2023-11-13 | Stop reason: SURG

## 2023-11-13 RX ORDER — OXYCODONE AND ACETAMINOPHEN 10; 325 MG/1; MG/1
1 TABLET ORAL ONCE AS NEEDED
Status: DISCONTINUED | OUTPATIENT
Start: 2023-11-13 | End: 2023-11-13 | Stop reason: HOSPADM

## 2023-11-13 RX ORDER — BISACODYL 10 MG
10 SUPPOSITORY, RECTAL RECTAL DAILY PRN
Status: DISCONTINUED | OUTPATIENT
Start: 2023-11-13 | End: 2023-11-15 | Stop reason: HOSPADM

## 2023-11-13 RX ORDER — DEXAMETHASONE SODIUM PHOSPHATE 4 MG/ML
INJECTION, SOLUTION INTRA-ARTICULAR; INTRALESIONAL; INTRAMUSCULAR; INTRAVENOUS; SOFT TISSUE AS NEEDED
Status: DISCONTINUED | OUTPATIENT
Start: 2023-11-13 | End: 2023-11-13 | Stop reason: SURG

## 2023-11-13 RX ORDER — FENTANYL CITRATE 50 UG/ML
25 INJECTION, SOLUTION INTRAMUSCULAR; INTRAVENOUS
Status: DISCONTINUED | OUTPATIENT
Start: 2023-11-13 | End: 2023-11-13 | Stop reason: HOSPADM

## 2023-11-13 RX ORDER — FLUMAZENIL 0.1 MG/ML
0.2 INJECTION INTRAVENOUS AS NEEDED
Status: DISCONTINUED | OUTPATIENT
Start: 2023-11-13 | End: 2023-11-13 | Stop reason: HOSPADM

## 2023-11-13 RX ORDER — SODIUM CHLORIDE 0.9 % (FLUSH) 0.9 %
3-10 SYRINGE (ML) INJECTION AS NEEDED
Status: DISCONTINUED | OUTPATIENT
Start: 2023-11-13 | End: 2023-11-13 | Stop reason: HOSPADM

## 2023-11-13 RX ORDER — ONDANSETRON 2 MG/ML
4 INJECTION INTRAMUSCULAR; INTRAVENOUS EVERY 6 HOURS PRN
Status: DISCONTINUED | OUTPATIENT
Start: 2023-11-13 | End: 2023-11-15 | Stop reason: HOSPADM

## 2023-11-13 RX ORDER — SODIUM CHLORIDE 9 MG/ML
40 INJECTION, SOLUTION INTRAVENOUS AS NEEDED
Status: DISCONTINUED | OUTPATIENT
Start: 2023-11-13 | End: 2023-11-13 | Stop reason: HOSPADM

## 2023-11-13 RX ORDER — LABETALOL HYDROCHLORIDE 5 MG/ML
5 INJECTION, SOLUTION INTRAVENOUS
Status: DISCONTINUED | OUTPATIENT
Start: 2023-11-13 | End: 2023-11-13 | Stop reason: HOSPADM

## 2023-11-13 RX ORDER — INDOCYANINE GREEN AND WATER 25 MG
5 KIT INJECTION ONCE
Status: DISCONTINUED | OUTPATIENT
Start: 2023-11-13 | End: 2023-11-13

## 2023-11-13 RX ORDER — MORPHINE SULFATE 2 MG/ML
2 INJECTION, SOLUTION INTRAMUSCULAR; INTRAVENOUS EVERY 4 HOURS PRN
Status: DISCONTINUED | OUTPATIENT
Start: 2023-11-13 | End: 2023-11-15 | Stop reason: HOSPADM

## 2023-11-13 RX ORDER — NEOSTIGMINE METHYLSULFATE 5 MG/5 ML
SYRINGE (ML) INTRAVENOUS AS NEEDED
Status: DISCONTINUED | OUTPATIENT
Start: 2023-11-13 | End: 2023-11-13 | Stop reason: SURG

## 2023-11-13 RX ORDER — PROPOFOL 10 MG/ML
VIAL (ML) INTRAVENOUS AS NEEDED
Status: DISCONTINUED | OUTPATIENT
Start: 2023-11-13 | End: 2023-11-13 | Stop reason: SURG

## 2023-11-13 RX ORDER — AMOXICILLIN 250 MG
2 CAPSULE ORAL 2 TIMES DAILY
Status: DISCONTINUED | OUTPATIENT
Start: 2023-11-14 | End: 2023-11-13 | Stop reason: SDUPTHER

## 2023-11-13 RX ORDER — POLYETHYLENE GLYCOL 3350 17 G/17G
17 POWDER, FOR SOLUTION ORAL DAILY PRN
Status: DISCONTINUED | OUTPATIENT
Start: 2023-11-13 | End: 2023-11-15 | Stop reason: HOSPADM

## 2023-11-13 RX ORDER — SODIUM CHLORIDE, SODIUM LACTATE, POTASSIUM CHLORIDE, CALCIUM CHLORIDE 600; 310; 30; 20 MG/100ML; MG/100ML; MG/100ML; MG/100ML
100 INJECTION, SOLUTION INTRAVENOUS CONTINUOUS
Status: DISCONTINUED | OUTPATIENT
Start: 2023-11-13 | End: 2023-11-13 | Stop reason: HOSPADM

## 2023-11-13 RX ORDER — BISACODYL 5 MG/1
5 TABLET, DELAYED RELEASE ORAL DAILY PRN
Status: DISCONTINUED | OUTPATIENT
Start: 2023-11-13 | End: 2023-11-15 | Stop reason: HOSPADM

## 2023-11-13 RX ORDER — AMOXICILLIN 250 MG
2 CAPSULE ORAL 2 TIMES DAILY
Status: DISCONTINUED | OUTPATIENT
Start: 2023-11-13 | End: 2023-11-15 | Stop reason: HOSPADM

## 2023-11-13 RX ORDER — MAGNESIUM HYDROXIDE 1200 MG/15ML
LIQUID ORAL AS NEEDED
Status: DISCONTINUED | OUTPATIENT
Start: 2023-11-13 | End: 2023-11-13 | Stop reason: HOSPADM

## 2023-11-13 RX ORDER — SODIUM CHLORIDE 0.9 % (FLUSH) 0.9 %
3 SYRINGE (ML) INJECTION EVERY 12 HOURS SCHEDULED
Status: DISCONTINUED | OUTPATIENT
Start: 2023-11-13 | End: 2023-11-13 | Stop reason: HOSPADM

## 2023-11-13 RX ORDER — BUDESONIDE AND FORMOTEROL FUMARATE DIHYDRATE 160; 4.5 UG/1; UG/1
1 AEROSOL RESPIRATORY (INHALATION)
Status: DISCONTINUED | OUTPATIENT
Start: 2023-11-13 | End: 2023-11-15 | Stop reason: HOSPADM

## 2023-11-13 RX ORDER — ALBUTEROL SULFATE 1.25 MG/3ML
1.25 SOLUTION RESPIRATORY (INHALATION)
Status: DISCONTINUED | OUTPATIENT
Start: 2023-11-14 | End: 2023-11-15 | Stop reason: HOSPADM

## 2023-11-13 RX ORDER — ONDANSETRON 2 MG/ML
INJECTION INTRAMUSCULAR; INTRAVENOUS AS NEEDED
Status: DISCONTINUED | OUTPATIENT
Start: 2023-11-13 | End: 2023-11-13 | Stop reason: SURG

## 2023-11-13 RX ORDER — SODIUM CHLORIDE 9 MG/ML
INJECTION, SOLUTION INTRAVENOUS AS NEEDED
Status: DISCONTINUED | OUTPATIENT
Start: 2023-11-13 | End: 2023-11-13 | Stop reason: HOSPADM

## 2023-11-13 RX ORDER — TAMSULOSIN HYDROCHLORIDE 0.4 MG/1
0.4 CAPSULE ORAL DAILY
Status: DISCONTINUED | OUTPATIENT
Start: 2023-11-14 | End: 2023-11-15 | Stop reason: HOSPADM

## 2023-11-13 RX ORDER — FENTANYL CITRATE 50 UG/ML
INJECTION, SOLUTION INTRAMUSCULAR; INTRAVENOUS AS NEEDED
Status: DISCONTINUED | OUTPATIENT
Start: 2023-11-13 | End: 2023-11-13 | Stop reason: SURG

## 2023-11-13 RX ORDER — ACETAMINOPHEN 325 MG/1
650 TABLET ORAL EVERY 6 HOURS PRN
Status: DISCONTINUED | OUTPATIENT
Start: 2023-11-13 | End: 2023-11-15 | Stop reason: HOSPADM

## 2023-11-13 RX ORDER — ONDANSETRON 2 MG/ML
4 INJECTION INTRAMUSCULAR; INTRAVENOUS
Status: DISCONTINUED | OUTPATIENT
Start: 2023-11-13 | End: 2023-11-13 | Stop reason: HOSPADM

## 2023-11-13 RX ORDER — HYDROMORPHONE HYDROCHLORIDE 1 MG/ML
0.5 INJECTION, SOLUTION INTRAMUSCULAR; INTRAVENOUS; SUBCUTANEOUS
Status: DISCONTINUED | OUTPATIENT
Start: 2023-11-13 | End: 2023-11-13 | Stop reason: HOSPADM

## 2023-11-13 RX ORDER — NALOXONE HCL 0.4 MG/ML
0.04 VIAL (ML) INJECTION AS NEEDED
Status: DISCONTINUED | OUTPATIENT
Start: 2023-11-13 | End: 2023-11-13 | Stop reason: HOSPADM

## 2023-11-13 RX ADMIN — LIDOCAINE HYDROCHLORIDE 100 MG: 20 INJECTION, SOLUTION EPIDURAL; INFILTRATION; INTRACAUDAL; PERINEURAL at 13:45

## 2023-11-13 RX ADMIN — SODIUM CHLORIDE, POTASSIUM CHLORIDE, SODIUM LACTATE AND CALCIUM CHLORIDE 100 ML/HR: 600; 310; 30; 20 INJECTION, SOLUTION INTRAVENOUS at 12:57

## 2023-11-13 RX ADMIN — HYDROMORPHONE HYDROCHLORIDE 1 MG: 1 INJECTION, SOLUTION INTRAMUSCULAR; INTRAVENOUS; SUBCUTANEOUS at 15:00

## 2023-11-13 RX ADMIN — SODIUM CHLORIDE, POTASSIUM CHLORIDE, SODIUM LACTATE AND CALCIUM CHLORIDE 100 ML/HR: 600; 310; 30; 20 INJECTION, SOLUTION INTRAVENOUS at 17:06

## 2023-11-13 RX ADMIN — ALBUTEROL SULFATE 1.25 MG: 1.25 SOLUTION RESPIRATORY (INHALATION) at 06:13

## 2023-11-13 RX ADMIN — PROPOFOL 200 MG: 10 INJECTION, EMULSION INTRAVENOUS at 13:45

## 2023-11-13 RX ADMIN — ALBUTEROL SULFATE 1.25 MG: 1.25 SOLUTION RESPIRATORY (INHALATION) at 10:24

## 2023-11-13 RX ADMIN — GLYCOPYRROLATE 0.6 MG: 0.2 INJECTION INTRAMUSCULAR; INTRAVENOUS at 14:56

## 2023-11-13 RX ADMIN — SODIUM CHLORIDE 100 ML/HR: 9 INJECTION, SOLUTION INTRAVENOUS at 23:49

## 2023-11-13 RX ADMIN — Medication 200 MCG: at 14:16

## 2023-11-13 RX ADMIN — Medication 5 MG: at 14:56

## 2023-11-13 RX ADMIN — FAMOTIDINE 20 MG: 10 INJECTION INTRAVENOUS at 08:12

## 2023-11-13 RX ADMIN — PIPERACILLIN SODIUM AND TAZOBACTAM SODIUM 3.38 G: 3; .375 INJECTION, SOLUTION INTRAVENOUS at 06:32

## 2023-11-13 RX ADMIN — ALBUTEROL SULFATE 1.25 MG: 1.25 SOLUTION RESPIRATORY (INHALATION) at 03:03

## 2023-11-13 RX ADMIN — Medication 100 MCG: at 13:58

## 2023-11-13 RX ADMIN — SODIUM CHLORIDE, POTASSIUM CHLORIDE, SODIUM LACTATE AND CALCIUM CHLORIDE: 600; 310; 30; 20 INJECTION, SOLUTION INTRAVENOUS at 14:55

## 2023-11-13 RX ADMIN — ONDANSETRON 4 MG: 2 INJECTION INTRAMUSCULAR; INTRAVENOUS at 14:37

## 2023-11-13 RX ADMIN — BUDESONIDE AND FORMOTEROL FUMARATE DIHYDRATE 1 PUFF: 160; 4.5 AEROSOL RESPIRATORY (INHALATION) at 21:12

## 2023-11-13 RX ADMIN — BUDESONIDE AND FORMOTEROL FUMARATE DIHYDRATE 1 PUFF: 160; 4.5 AEROSOL RESPIRATORY (INHALATION) at 06:13

## 2023-11-13 RX ADMIN — PIPERACILLIN SODIUM AND TAZOBACTAM SODIUM 3.38 G: 3; .375 INJECTION, SOLUTION INTRAVENOUS at 14:29

## 2023-11-13 RX ADMIN — FENTANYL CITRATE 100 MCG: 50 INJECTION, SOLUTION INTRAMUSCULAR; INTRAVENOUS at 13:45

## 2023-11-13 RX ADMIN — PIPERACILLIN SODIUM AND TAZOBACTAM SODIUM 3.38 G: 3; .375 INJECTION, SOLUTION INTRAVENOUS at 23:49

## 2023-11-13 RX ADMIN — ALBUTEROL SULFATE 2.5 MG: 2.5 SOLUTION RESPIRATORY (INHALATION) at 21:12

## 2023-11-13 RX ADMIN — ROCURONIUM BROMIDE 20 MG: 10 INJECTION, SOLUTION INTRAVENOUS at 14:04

## 2023-11-13 RX ADMIN — FENTANYL CITRATE 100 MCG: 50 INJECTION, SOLUTION INTRAMUSCULAR; INTRAVENOUS at 14:10

## 2023-11-13 RX ADMIN — DEXAMETHASONE SODIUM PHOSPHATE 8 MG: 4 INJECTION, SOLUTION INTRA-ARTICULAR; INTRALESIONAL; INTRAMUSCULAR; INTRAVENOUS; SOFT TISSUE at 14:37

## 2023-11-13 RX ADMIN — SODIUM CHLORIDE 100 ML/HR: 9 INJECTION, SOLUTION INTRAVENOUS at 04:20

## 2023-11-13 RX ADMIN — ROCURONIUM BROMIDE 50 MG: 10 INJECTION, SOLUTION INTRAVENOUS at 13:45

## 2023-11-13 NOTE — PLAN OF CARE
Goal Outcome Evaluation:  Plan of Care Reviewed With: patient        Progress: no change          Pt with no complaints of pain so far during shift. IVF and abx infusing per order. Potassium replacement completed this shift. Pt disoriented to time and situation. Voiding; incontinent at times. NPO for possible surgery later today. Positive sepsis screen. SCDs on. Safety maintained. Bed alarm on. Sister at bedside.

## 2023-11-13 NOTE — ANESTHESIA PREPROCEDURE EVALUATION
Anesthesia Evaluation     Patient summary reviewed   no history of anesthetic complications:   NPO Solid Status: > 8 hours             Airway   Mallampati: II  TM distance: >3 FB  Neck ROM: full  Dental    (+) poor dentition        Pulmonary    (+) asthma,  (-) sleep apnea, not a smoker  Cardiovascular     (+) hypertension, hyperlipidemia      Neuro/Psych  (-) seizures, TIA, CVA  GI/Hepatic/Renal/Endo    (+) GERD, renal disease- CRI  (-) liver disease, diabetes, no thyroid disorder    Musculoskeletal     Abdominal    Substance History      OB/GYN          Other      history of cancer    ROS/Med Hx Other: Acute cholecystitis                    Anesthesia Plan    ASA 3 - emergent     general     intravenous induction     Anesthetic plan, risks, benefits, and alternatives have been provided, discussed and informed consent has been obtained with: patient.        CODE STATUS:    Level Of Support Discussed With: Patient  Code Status (Patient has no pulse and is not breathing): CPR (Attempt to Resuscitate)  Medical Interventions (Patient has pulse or is breathing): Full Support

## 2023-11-13 NOTE — PLAN OF CARE
Goal Outcome Evaluation:      Pt went this afternoon for laparoscopic cholecystectomy. Pt alert. Denies pain. 4 surgical sites to abdomen with glue and 1 CINDY drain. Small amount of drainage noted in CINDY drain. F/C. Family at bedside. SCD. Will continue to monitor pt.

## 2023-11-13 NOTE — CONSULTS
[facility] - Consult Note    Patient Name: Abhishek Mendieta  : 1945  MRN: [id number]  Primary Care Physician:  Matt Garcia DO  Referring Physician: Inocente Rudd MD  Date of admission: 2023    Consults    Subjective      Reason for Consult/ Chief Complaint: abdominal pain    History of Present Illness: Abhishek Mendieta is a 77 y.o. male who presents with two day history of abdominal pain. Reports RUQ and epigastric pain, moderate intensity, non-radiating. Associated nausea/vomiting. No chest pains/SOB.     ROS abdominal pain    Personal History     Past Medical History:   Diagnosis Date    Anxiety     Hypertension        Past Surgical History:   Procedure Laterality Date    PROSTATE SURGERY         Family History: family history includes Asthma in his brother; Cancer in his brother and father; Diabetes in his mother; Heart disease in his mother; Kidney disease in his mother. Otherwise pertinent FHx was reviewed and not pertinent to current issue.    Social History:  reports that he has never smoked. He has never used smokeless tobacco. He reports that he does not drink alcohol and does not use drugs.    Home Medications:   albuterol, albuterol sulfate HFA, aspirin, benzonatate, budesonide, cetirizine, cholecalciferol, fluticasone-salmeterol, guaiFENesin, hydroCHLOROthiazide, ipratropium-albuterol, losartan, montelukast, pantoprazole, simvastatin, tamsulosin, tiotropium bromide monohydrate, and vitamin C    Allergies:  No Known Allergies      Objective      Vitals:  Temp:  [97.8 °F (36.6 °C)-98.9 °F (37.2 °C)] 98 °F (36.7 °C)  Heart Rate:  [] 52  Resp:  [16-20] 16  BP: (107-137)/(58-79) 137/63    Physical Exam  Constitutional:       General: He is not in acute distress.     Appearance: Normal appearance.   HENT:      Head: Normocephalic and atraumatic.   Eyes:      Extraocular Movements: Extraocular movements intact.      Pupils: Pupils are  equal, round, and reactive to light.   Cardiovascular:      Rate and Rhythm: Normal rate and regular rhythm.      Pulses: Normal pulses.   Pulmonary:      Effort: Pulmonary effort is normal. No respiratory distress.      Breath sounds: Normal breath sounds.   Abdominal:      General: Bowel sounds are normal. There is no distension.      Palpations: Abdomen is soft. RUQ TTP   Neurological:      Mental Status: He is alert.   Psychiatric:         Mood and Affect: Mood normal.         Behavior: Behavior normal.     Result Review    Result Review:  I have personally reviewed the results from the time of this admission to 11/13/2023 09:10 CST and agree with these findings:  [x]  Laboratory  []  Microbiology  [x]  Radiology  []  EKG/Telemetry   []  Cardiology/Vascular   []  Pathology  []  Old records  []  Other:        Assessment & Plan        Active Hospital Problems:  Active Hospital Problems    Diagnosis     **Acute cholecystitis      Plan:   To OR for lap cholecystectomy  NPO  IVF  IV antibiotics      Carmelina Roman MD  General Surgery  11/13/23  09:10 CST

## 2023-11-13 NOTE — CASE MANAGEMENT/SOCIAL WORK
"Discharge Planning Assessment  [facility]     Patient Name: Abhishek Mendieta  MRN: [id number]  Today's Date: 11/13/2023    Admit Date: 11/12/2023        Discharge Needs Assessment       Row Name 11/13/23 1013       Living Environment    People in Home sibling(s)    Name(s) of People in Home Florinda Gill - sister    Current Living Arrangements home    Potentially Unsafe Housing Conditions none    In the past 12 months has the electric, gas, oil, or water company threatened to shut off services in your home? No    Primary Care Provided by self    Provides Primary Care For no one    Family Caregiver if Needed sibling(s)    Quality of Family Relationships helpful;involved;supportive    Able to Return to Prior Arrangements yes       Resource/Environmental Concerns    Resource/Environmental Concerns none    Transportation Concerns none       Food Insecurity    Within the past 12 months, you worried that your food would run out before you got the money to buy more. Never true    Within the past 12 months, the food you bought just didn't last and you didn't have money to get more. Never true       Transition Planning    Patient/Family Anticipates Transition to home with family    Transportation Anticipated family or friend will provide       Discharge Needs Assessment    Readmission Within the Last 30 Days no previous admission in last 30 days    Equipment Currently Used at Home nebulizer    Concerns to be Addressed discharge planning    Anticipated Changes Related to Illness none    Discharge Coordination/Progress Pt lives with sister who is at bedside, pt has trouble understanding some things, unclear of type of mentation issues - \"simple\" but very polite. He can answer some things, others are answered by sister.  Pt independent at home does not drive, sister takes pt to places.  Only uses a nebulizer per sister.  will be seeing a new PCP (LISA Garcia) in a month due to [employer] no longer covering his other PCP.  Has RX " coverage, anticipates DC home when better, currently denies needs.                   Discharge Plan    No documentation.                 Continued Care and Services - Admitted Since 11/12/2023    Coordination has not been started for this encounter.          Demographic Summary    No documentation.                  Functional Status    No documentation.                  Psychosocial    No documentation.                  Abuse/Neglect    No documentation.                  Legal    No documentation.                  Substance Abuse    No documentation.                  Patient Forms    No documentation.                     Christina Joe RN

## 2023-11-13 NOTE — OP NOTE
Operative Note    CHOLECYSTECTOMY LAPAROSCOPIC INTRAOPERATIVE CHOLANGIOGRAM      Abhishek Mendieta  11/13/2023    Pre-op Diagnosis:   Acute cholecystitis [K81.0]       Post-Op Diagnosis Codes:     * Acute cholecystitis [K81.0]  Acute gangrenous cholecystitis    Procedure/CPT® Codes:    Laparoscopic cholecystectomy    Procedure(s):  CHOLECYSTECTOMY LAPAROSCOPIC WITH POSSIBLE INTRAOPERATIVE CHOLANGIOGRAM              Surgeon(s):  Carmelina Roman MD    Anesthesia: General    Staff:   Circulator: Sadiq Wolf RN; Fátima Castillo RN  Scrub Person: Zulema Snyder; Tomas Smith         Estimated Blood Loss: minimal    Urine Voided: * No values recorded between 11/13/2023  1:37 PM and 11/13/2023  2:59 PM *    Specimens:                          Drains:   Urethral Catheter Double-lumen 16 Fr. (Active)       Findings: gangrenous gallbladder        Complications: none          Carmelina Roman MD     Date: 11/13/2023  Time: 14:59 CST      Procedure Description: The patient was taken to the OR, given IV sedation and ETT. Veress access was obtained, followed by placement of three 5mm trocars along the right costal margin and another 12mm trocar was placed. The gallbladder was viewed and noted to be dark, gangrenous with substantial inflammation. The gallbladder was reflected over the liver and lateral attachments to the liver were released. Next, dissection revealed the cystic triad and a bare hepatic plate, the critical view of safety. The cystic duct was substantially inflamed and large. ICG imaging supported the anatomy and a stapler was used to transect the cystic duct. Two clips were then placed distally on the cystic artery and one clip was placed proximally. The cystic artery was transected. The gallbladder was then dissected off the liver bed. There were points were the gallbladder was severely attached and inflamed to the liver, so a decision was made to leave portions of the backwall of the  gallbladder. A drain was then placed in the hepatic fossa and the gallbladder was sent to pathology. The skin was closed with 4-0 monocryl and skin glue. Counts were correct x 2. The patient tolerated the procedure well.     Carmelina Roman MD  General Surgery  11/13/23  14:59 CST    Carmelina Roman MD  General Surgery  11/13/23  14:59 CST

## 2023-11-13 NOTE — ANESTHESIA POSTPROCEDURE EVALUATION
"Patient: Abhishek Mendieta    Procedure Summary       Date: 11/13/23 Room / Location:  [facility] OR  /  [facility] OR    Anesthesia Start: 1337 Anesthesia Stop: 1522    Procedure: CHOLECYSTECTOMY LAPAROSCOPIC WITH POSSIBLE INTRAOPERATIVE CHOLANGIOGRAM (Abdomen) Diagnosis:       Acute cholecystitis      (Acute cholecystitis [K81.0])    Surgeons: Carmelina Roman MD Provider: Santosh Escobar CRNA    Anesthesia Type: general ASA Status: 3 - Emergent            Anesthesia Type: general    Vitals  Vitals Value Taken Time   /72 11/13/23 1556   Temp 97.8 °F (36.6 °C) 11/13/23 1600   Pulse 87 11/13/23 1600   Resp 15 11/13/23 1600   SpO2 96 % 11/13/23 1600           Post Anesthesia Care and Evaluation    Patient location during evaluation: PACU  Patient participation: complete - patient participated  Level of consciousness: awake and awake and alert  Pain score: 0  Pain management: adequate    Airway patency: patent  Anesthetic complications: No anesthetic complications  PONV Status: none  Cardiovascular status: acceptable  Respiratory status: acceptable  Hydration status: acceptable    Comments: Patient discharged according to acceptable Duane score per RN assessment. See nursing records for further information.     Blood pressure 116/61, pulse 89, temperature 98 °F (36.7 °C), temperature source Oral, resp. rate 16, height 177.8 cm (70\"), weight 71.8 kg (158 lb 3.2 oz), SpO2 96%.      "

## 2023-11-13 NOTE — PLAN OF CARE
Goal Outcome Evaluation:  Plan of Care Reviewed With: patient        Progress: no change  Outcome Evaluation: Admitted from Er. Oriented to Self and place. Voiding. IVF infusing. Breathing treatment given per respiratory. Sister at bedside.

## 2023-11-13 NOTE — ANESTHESIA PROCEDURE NOTES
Airway  Urgency: elective    Date/Time: 11/13/2023 1:46 PM  Airway not difficult    General Information and Staff    Patient location during procedure: OR  CRNA/CAA: Santosh Escobar CRNA    Indications and Patient Condition    Preoxygenated: yes  Mask difficulty assessment: 1 - vent by mask    Final Airway Details  Final airway type: endotracheal airway      Successful airway: ETT  Cuffed: yes   Successful intubation technique: direct laryngoscopy  Endotracheal tube insertion site: oral  Blade: Hernandez  Blade size: 3.5  ETT size (mm): 7.5  Cormack-Lehane Classification: grade III - view of epiglottis only  Placement verified by: chest auscultation   Cuff volume (mL): 7  Measured from: lips  ETT/EBT  to lips (cm): 22  Number of attempts at approach: 1  Assessment: lips, teeth, and gum same as pre-op and atraumatic intubation    Additional Comments  Stick neck

## 2023-11-14 LAB
ALBUMIN SERPL-MCNC: 3 G/DL (ref 3.5–5.2)
ALBUMIN/GLOB SERPL: 0.9 G/DL
ALP SERPL-CCNC: 69 U/L (ref 39–117)
ALT SERPL W P-5'-P-CCNC: 31 U/L (ref 1–41)
ANION GAP SERPL CALCULATED.3IONS-SCNC: 8 MMOL/L (ref 5–15)
AST SERPL-CCNC: 33 U/L (ref 1–40)
BILIRUB SERPL-MCNC: 0.6 MG/DL (ref 0–1.2)
BUN SERPL-MCNC: 20 MG/DL (ref 8–23)
BUN/CREAT SERPL: 21.1 (ref 7–25)
CALCIUM SPEC-SCNC: 8.6 MG/DL (ref 8.6–10.5)
CHLORIDE SERPL-SCNC: 102 MMOL/L (ref 98–107)
CO2 SERPL-SCNC: 26 MMOL/L (ref 22–29)
CREAT SERPL-MCNC: 0.95 MG/DL (ref 0.76–1.27)
EGFRCR SERPLBLD CKD-EPI 2021: 81.9 ML/MIN/1.73
GLOBULIN UR ELPH-MCNC: 3.2 GM/DL
GLUCOSE SERPL-MCNC: 173 MG/DL (ref 65–99)
POTASSIUM SERPL-SCNC: 3.7 MMOL/L (ref 3.5–5.2)
PROT SERPL-MCNC: 6.2 G/DL (ref 6–8.5)
SODIUM SERPL-SCNC: 136 MMOL/L (ref 136–145)

## 2023-11-14 PROCEDURE — 94799 UNLISTED PULMONARY SVC/PX: CPT

## 2023-11-14 PROCEDURE — 25810000003 SODIUM CHLORIDE 0.9 % SOLUTION: Performed by: HOSPITALIST

## 2023-11-14 PROCEDURE — 97161 PT EVAL LOW COMPLEX 20 MIN: CPT | Performed by: PHYSICAL THERAPIST

## 2023-11-14 PROCEDURE — 97165 OT EVAL LOW COMPLEX 30 MIN: CPT | Performed by: OCCUPATIONAL THERAPIST

## 2023-11-14 PROCEDURE — 25010000002 PIPERACILLIN SOD-TAZOBACTAM PER 1 G: Performed by: HOSPITALIST

## 2023-11-14 PROCEDURE — 25010000002 ONDANSETRON PER 1 MG: Performed by: INTERNAL MEDICINE

## 2023-11-14 PROCEDURE — 94664 DEMO&/EVAL PT USE INHALER: CPT

## 2023-11-14 PROCEDURE — 80053 COMPREHEN METABOLIC PANEL: CPT | Performed by: SURGERY

## 2023-11-14 RX ADMIN — DOCUSATE SODIUM 50 MG AND SENNOSIDES 8.6 MG 2 TABLET: 8.6; 5 TABLET, FILM COATED ORAL at 08:31

## 2023-11-14 RX ADMIN — PIPERACILLIN SODIUM AND TAZOBACTAM SODIUM 3.38 G: 3; .375 INJECTION, SOLUTION INTRAVENOUS at 22:21

## 2023-11-14 RX ADMIN — PIPERACILLIN SODIUM AND TAZOBACTAM SODIUM 3.38 G: 3; .375 INJECTION, SOLUTION INTRAVENOUS at 08:30

## 2023-11-14 RX ADMIN — ONDANSETRON 4 MG: 2 INJECTION INTRAMUSCULAR; INTRAVENOUS at 02:47

## 2023-11-14 RX ADMIN — ALBUTEROL SULFATE 1.25 MG: 1.25 SOLUTION RESPIRATORY (INHALATION) at 23:31

## 2023-11-14 RX ADMIN — ATORVASTATIN CALCIUM 20 MG: 10 TABLET, FILM COATED ORAL at 08:30

## 2023-11-14 RX ADMIN — SODIUM CHLORIDE 100 ML/HR: 9 INJECTION, SOLUTION INTRAVENOUS at 08:39

## 2023-11-14 RX ADMIN — CETIRIZINE HYDROCHLORIDE 10 MG: 10 TABLET ORAL at 08:30

## 2023-11-14 RX ADMIN — ALBUTEROL SULFATE 1.25 MG: 1.25 SOLUTION RESPIRATORY (INHALATION) at 10:48

## 2023-11-14 RX ADMIN — ALBUTEROL SULFATE 1.25 MG: 1.25 SOLUTION RESPIRATORY (INHALATION) at 04:20

## 2023-11-14 RX ADMIN — ALBUTEROL SULFATE 1.25 MG: 1.25 SOLUTION RESPIRATORY (INHALATION) at 07:29

## 2023-11-14 RX ADMIN — BUDESONIDE AND FORMOTEROL FUMARATE DIHYDRATE 1 PUFF: 160; 4.5 AEROSOL RESPIRATORY (INHALATION) at 19:05

## 2023-11-14 RX ADMIN — PIPERACILLIN SODIUM AND TAZOBACTAM SODIUM 3.38 G: 3; .375 INJECTION, SOLUTION INTRAVENOUS at 15:22

## 2023-11-14 RX ADMIN — TAMSULOSIN HYDROCHLORIDE 0.4 MG: 0.4 CAPSULE ORAL at 08:31

## 2023-11-14 RX ADMIN — BUDESONIDE AND FORMOTEROL FUMARATE DIHYDRATE 1 PUFF: 160; 4.5 AEROSOL RESPIRATORY (INHALATION) at 07:36

## 2023-11-14 RX ADMIN — ALBUTEROL SULFATE 1.25 MG: 1.25 SOLUTION RESPIRATORY (INHALATION) at 19:05

## 2023-11-14 RX ADMIN — FAMOTIDINE 20 MG: 10 INJECTION INTRAVENOUS at 08:31

## 2023-11-14 NOTE — THERAPY EVALUATION
Patient Name: Abhishek Mendieta  : 1945    MRN: [id number]                              Today's Date: 2023       Admit Date: 2023    Visit Dx:     ICD-10-CM ICD-9-CM   1. Acute cholecystitis  K81.0 575.0   2. Acute kidney injury  N17.9 584.9   3. Impaired mobility [Z74.09]  Z74.09 799.89   4. Decreased activities of daily living (ADL) [Z78.9]  Z78.9 V49.89     Patient Active Problem List   Diagnosis    Prediabetes    Essential hypertension    Hyperlipidemia    Gastroesophageal reflux disease    Benign prostatic hyperplasia    Moderate persistent asthma without complication    Vitamin D deficiency    History of prostate cancer    Acute cholecystitis     Past Medical History:   Diagnosis Date    Anxiety     Hypertension      Past Surgical History:   Procedure Laterality Date    CHOLECYSTECTOMY WITH INTRAOPERATIVE CHOLANGIOGRAM N/A 2023    Procedure: CHOLECYSTECTOMY LAPAROSCOPIC WITH POSSIBLE INTRAOPERATIVE CHOLANGIOGRAM;  Surgeon: Carmelina Roman MD;  Location: [facility] OR;  Service: General;  Laterality: N/A;    PROSTATE SURGERY        General Information       Row Name 23 1450          OT Time and Intention    Document Type evaluation  2023 s/p cholecystectomy  -MM     Mode of Treatment occupational therapy  -MM       Row Name 23 1450          General Information    Patient Profile Reviewed yes  -MM     Prior Level of Function independent:;all household mobility;community mobility;min assist:;dressing;bathing  -MM     Existing Precautions/Restrictions fall  -MM     Barriers to Rehab previous functional deficit;cognitive status  -MM       Row Name 23 1450          Living Environment    People in Home sibling(s)  tub/shower combo  -MM       Row Name 23 1450          Home Main Entrance    Number of Stairs, Main Entrance two  -MM       Row Name 23 1450          Stairs Within Home, Primary    Number of Stairs, Within Home, Primary none  -MM       Row Name 23  1450          Cognition    Orientation Status (Cognition) oriented to;person;place;situation;disoriented to;time  -MM       Row Name 11/14/23 1450          Safety Issues, Functional Mobility    Safety Issues Affecting Function (Mobility) impulsivity;insight into deficits/self-awareness;safety precaution awareness;safety precautions follow-through/compliance;problem-solving;awareness of need for assistance  -MM     Impairments Affecting Function (Mobility) balance;cognition  -MM     Cognitive Impairments, Mobility Safety/Performance impulsivity;insight into deficits/self-awareness;problem-solving/reasoning;safety precaution awareness;safety precaution follow-through;awareness, need for assistance  -MM               User Key  (r) = Recorded By, (t) = Taken By, (c) = Cosigned By      Initials Name Provider Type    MM Reilly Hernandez, OTR/L Occupational Therapist                     Mobility/ADL's       Row Name 11/14/23 1450          Bed Mobility    Bed Mobility supine-sit  -MM     Supine-Sit Hill (Bed Mobility) independent  -MM       Row Name 11/14/23 1450          Transfers    Transfers sit-stand transfer;stand-sit transfer  -MM       Row Name 11/14/23 1450          Sit-Stand Transfer    Sit-Stand Hill (Transfers) contact guard  -MM       Row Name 11/14/23 1450          Stand-Sit Transfer    Stand-Sit Hill (Transfers) contact guard  -MM       Row Name 11/14/23 1450          Functional Mobility    Functional Mobility- Ind. Level contact guard assist;minimum assist (75% patient effort);verbal cues required  -MM     Functional Mobility- Device walker, front-wheeled  -MM     Functional Mobility- Comment bed to BR to michele to bed to chair. Pt with min LOB.  -MM       Row Name 11/14/23 1450          Activities of Daily Living    BADL Assessment/Intervention toileting;lower body dressing  -MM       Row Name 11/14/23 1450          Toileting Assessment/Training    Hill Level (Toileting)  contact guard assist;verbal cues;toileting skills  -MM     Position (Toileting) supported standing  -MM       Row Name 11/14/23 1450          Lower Body Dressing Assessment/Training    Pepin Level (Lower Body Dressing) supervision;verbal cues  adjust socks  -MM     Position (Lower Body Dressing) supported sitting  -MM               User Key  (r) = Recorded By, (t) = Taken By, (c) = Cosigned By      Initials Name Provider Type    Reilly Ely, OTR/L Occupational Therapist                   Obj/Interventions       Row Name 11/14/23 1450          Range of Motion Comprehensive    General Range of Motion bilateral upper extremity ROM WNL  -MM       Row Name 11/14/23 1450          Strength Comprehensive (MMT)    Comment, General Manual Muscle Testing (MMT) Assessment BUE functionally 4/5  -MM       Row Name 11/14/23 1450          Balance    Balance Assessment sitting static balance;sitting dynamic balance;standing static balance;standing dynamic balance  -MM     Static Sitting Balance contact guard  -MM     Dynamic Sitting Balance contact guard  -MM     Position, Sitting Balance supported;sitting edge of bed  -MM     Static Standing Balance contact guard;verbal cues  -MM     Dynamic Standing Balance contact guard;minimal assist;verbal cues  -MM     Position/Device Used, Standing Balance supported;walker, front-wheeled  -MM               User Key  (r) = Recorded By, (t) = Taken By, (c) = Cosigned By      Initials Name Provider Type    Reilly Ely, OTR/L Occupational Therapist                   Goals/Plan       Row Name 11/14/23 1450          Bathing Goal 1 (OT)    Activity/Device (Bathing Goal 1, OT) bathing skills, all  -MM     Pepin Level/Cues Needed (Bathing Goal 1, OT) minimum assist (75% or more patient effort);set-up required;verbal cues required  -MM     Time Frame (Bathing Goal 1, OT) long term goal (LTG);by discharge  -MM     Progress/Outcomes (Bathing Goal 1, OT) new goal  -MM        Row Name 11/14/23 [facility]          Dressing Goal 1 (OT)    Activity/Device (Dressing Goal 1, OT) dressing skills, all  -MM     [place]ok/Cues Needed (Dressing Goal 1, OT) modified independence;set-up required  -MM     Time Frame (Dressing Goal 1, OT) long term goal (LTG);by discharge  -MM     Progress/Outcome (Dressing Goal 1, OT) new goal  -MM       Row Name 11/14/23 1450          Toileting Goal 1 (OT)    Activity/Device (Toileting Goal 1, OT) toileting skills, all  -MM     [place] Level/Cues Needed (Toileting Goal 1, OT) standby assist  -MM     Time Frame (Toileting Goal 1, OT) long term goal (LTG);by discharge  -MM     Progress/Outcome (Toileting Goal 1, OT) new goal  -MM       Row Name 11/14/23 1450          Therapy Assessment/Plan (OT)    Planned Therapy Interventions (OT) activity tolerance training;adaptive equipment training;BADL retraining;cognitive/visual perception retraining;functional balance retraining;neuromuscular control/coordination retraining;occupation/activity based interventions;passive ROM/stretching;patient/caregiver education/training;ROM/therapeutic exercise;strengthening exercise;transfer/mobility retraining  -MM               User Key  (r) = Recorded By, (t) = Taken By, (c) = Cosigned By      Initials Name Provider Type    MM Reilly Hernandez, OTR/L Occupational Therapist                   Clinical Impression       Row Name 11/14/23 1450          Pain Assessment    Pretreatment Pain Rating 0/10 - no pain  -MM     Posttreatment Pain Rating 0/10 - no pain  -MM       Row Name 11/14/23 [facility]          Plan of Care Review    Plan of Care Reviewed With patient  -MM     Progress improving  -MM     Outcome Evaluation OT evaluation completed. Pt is alert and oriented x3 with verbal cues. pt reports no complaints. Pt is agreeable to work with therapy. Pt was CGA for sitting balance. Pt was CGA for t/f. pt was CGA-min A for functional mobility with RW to correct LOB. Pt was CGA for toileting. Pt  was supervision with verbal cues to adjust socks. Skilled OT recommended to address ADLs and functional mobility. Recommended d/c home with assist and HH.  -MM       Row Name 11/14/23 1450          Therapy Assessment/Plan (OT)    Patient/Family Therapy Goal Statement (OT) go home  -MM     Rehab Potential (OT) good, to achieve stated therapy goals  -MM     Criteria for Skilled Therapeutic Interventions Met (OT) yes;meets criteria;skilled treatment is necessary  -MM     Therapy Frequency (OT) 5 times/wk  -MM     Predicted Duration of Therapy Intervention (OT) until d/c  -MM       Row Name 11/14/23 1450          Therapy Plan Review/Discharge Plan (OT)    Anticipated Discharge Disposition (OT) home with assist;home with home health  -MM       Row Name 11/14/23 1450          Positioning and Restraints    Pre-Treatment Position in bed  -MM     Post Treatment Position chair  -MM     In Chair notified nsg;sitting;call light within reach;encouraged to call for assist;exit alarm on  -MM               User Key  (r) = Recorded By, (t) = Taken By, (c) = Cosigned By      Initials Name Provider Type    MM Reilly Hernandez, OTR/L Occupational Therapist                   Outcome Measures       Row Name 11/14/23 1450          How much help from another is currently needed...    Putting on and taking off regular lower body clothing? 3  -MM     Bathing (including washing, rinsing, and drying) 3  -MM     Toileting (which includes using toilet bed pan or urinal) 3  -MM     Putting on and taking off regular upper body clothing 3  -MM     Taking care of personal grooming (such as brushing teeth) 3  -MM     Eating meals 4  -MM     AM-PAC 6 Clicks Score (OT) 19  -MM       Row Name 11/14/23 1445 11/14/23 0800       How much help from another person do you currently need...    Turning from your back to your side while in flat bed without using bedrails? 3  -MS 4  -CM    Moving from lying on back to sitting on the side of a flat bed without  bedrails? 3  -MS 3  -CM    Moving to and from a bed to a chair (including a wheelchair)? 3  -MS 3  -CM    Standing up from a chair using your arms (e.g., wheelchair, bedside chair)? 3  -MS 3  -CM    Climbing 3-5 steps with a railing? 3  -MS 2  -CM    To walk in hospital room? 3  -MS 3  -CM    AM-PAC 6 Clicks Score (PT) 18  -MS 18  -CM    Highest Level of Mobility Goal 6 --> Walk 10 steps or more  -MS 6 --> Walk 10 steps or more  -CM      Row Name 11/14/23 1450 11/14/23 1445       Functional Assessment    Outcome Measure Options AM-PAC 6 Clicks Daily Activity (OT)  -MM AM-PAC 6 Clicks Basic Mobility (PT)  -MS              User Key  (r) = Recorded By, (t) = Taken By, (c) = Cosigned By      Initials Name Provider Type    MS Gila Luevano R, PT, DPT, NCS Physical Therapist    MM Reilly Hernandez, OTR/L Occupational Therapist    CM Karmen Swain, RN Registered Nurse                    Occupational Therapy Education       Title: PT OT SLP Therapies (In Progress)       Topic: Occupational Therapy (In Progress)       Point: ADL training (In Progress)       Description:   Instruct learner(s) on proper safety adaptation and remediation techniques during self care or transfers.   Instruct in proper use of assistive devices.                  Learning Progress Summary             Patient Acceptance, E, NR by MM at 11/14/2023 1636                         Point: Home exercise program (Not Started)       Description:   Instruct learner(s) on appropriate technique for monitoring, assisting and/or progressing therapeutic exercises/activities.                  Learner Progress:  Not documented in this visit.              Point: Precautions (In Progress)       Description:   Instruct learner(s) on prescribed precautions during self-care and functional transfers.                  Learning Progress Summary             Patient Acceptance, E, NR by MM at 11/14/2023 1636                         Point: Body mechanics (In Progress)        Description:   Instruct learner(s) on proper positioning and spine alignment during self-care, functional mobility activities and/or exercises.                  Learning Progress Summary             Patient Acceptance, E, NR by  at 11/14/2023 1636                                         User Key       Initials Effective Dates Name Provider Type Discipline     07/11/23 -  Reilly Hernandez OTR/L Occupational Therapist OT                  OT Recommendation and Plan  Planned Therapy Interventions (OT): activity tolerance training, adaptive equipment training, BADL retraining, cognitive/visual perception retraining, functional balance retraining, neuromuscular control/coordination retraining, occupation/activity based interventions, passive ROM/stretching, patient/caregiver education/training, ROM/therapeutic exercise, strengthening exercise, transfer/mobility retraining  Therapy Frequency (OT): 5 times/wk  Plan of Care Review  Plan of Care Reviewed With: patient  Progress: improving  Outcome Evaluation: OT evaluation completed. Pt is alert and oriented x3 with verbal cues. pt reports no complaints. Pt is agreeable to work with therapy. Pt was CGA for sitting balance. Pt was CGA for t/f. pt was CGA-min A for functional mobility with RW to correct LOB. Pt was CGA for toileting. Pt was supervision with verbal cues to adjust socks. Skilled OT recommended to address ADLs and functional mobility. Recommended d/c home with assist and HH.     Time Calculation:         Time Calculation- OT       Row Name 11/14/23 1450             Time Calculation- OT    OT Start Time 1450  -MM      OT Stop Time 1528  -MM      OT Time Calculation (min) 38 min  -MM      OT Received On 11/14/23  -MM      OT Goal Re-Cert Due Date 11/24/23  -MM                User Key  (r) = Recorded By, (t) = Taken By, (c) = Cosigned By      Initials Name Provider Type    MM Reilly Hernandez, OTR/L Occupational Therapist                  Therapy Charges  for Today       Code Description Service Date Service Provider Modifiers Qty    [id number] HC OT EVAL LOW COMPLEXITY 3 11/14/2023 Reilly Hernandez, OTR/L GO 1                 BEAU Myers/L  11/14/2023

## 2023-11-14 NOTE — PLAN OF CARE
Goal Outcome Evaluation:  Plan of Care Reviewed With: patient        Progress: improving  Outcome Evaluation: OT evaluation completed. Pt is alert and oriented x3 with verbal cues. pt reports no complaints. Pt is agreeable to work with therapy. Pt was CGA for sitting balance. Pt was CGA for t/f. pt was CGA-min A for functional mobility with RW to correct LOB. Pt was CGA for toileting. Pt was supervision with verbal cues to adjust socks. Skilled OT recommended to address ADLs and functional mobility. Recommended d/c home with assist and HH.      Anticipated Discharge Disposition (OT): home with assist, home with home health

## 2023-11-14 NOTE — PLAN OF CARE
Goal Outcome Evaluation:  Plan of Care Reviewed With: patient  Progress: no change         Pt with min c/o pain this shift; no request for prn pain meds. IVF and IV abx infused per orders. Lap sites x3 intact with dermabond. CINDY x1 in place with output-see flowsheet. Allred in place to BSD. Pt c/o nausea x2, prn Zofran given. Bed check in place. VSS. Safety maintained.

## 2023-11-14 NOTE — THERAPY EVALUATION
Patient Name: Abhishek Mendieta  : 1945    MRN: [id number]                              Today's Date: 2023       Admit Date: 2023    Visit Dx:     ICD-10-CM ICD-9-CM   1. Acute cholecystitis  K81.0 575.0   2. Acute kidney injury  N17.9 584.9   3. Impaired mobility [Z74.09]  Z74.09 799.89     Patient Active Problem List   Diagnosis    Prediabetes    Essential hypertension    Hyperlipidemia    Gastroesophageal reflux disease    Benign prostatic hyperplasia    Moderate persistent asthma without complication    Vitamin D deficiency    History of prostate cancer    Acute cholecystitis     Past Medical History:   Diagnosis Date    Anxiety     Hypertension      Past Surgical History:   Procedure Laterality Date    CHOLECYSTECTOMY WITH INTRAOPERATIVE CHOLANGIOGRAM N/A 2023    Procedure: CHOLECYSTECTOMY LAPAROSCOPIC WITH POSSIBLE INTRAOPERATIVE CHOLANGIOGRAM;  Surgeon: Carmelina Roman MD;  Location: [facility];  Service: General;  Laterality: N/A;    PROSTATE SURGERY        General Information       Row Name 23 1445          Physical Therapy Time and Intention    Document Type evaluation  s/p lap cholecystectomy  -MS     Mode of Treatment physical therapy;co-treatment  -MS       Row Name 23 1445          General Information    Patient Profile Reviewed yes  -MS     Prior Level of Function independent:;all household mobility;community mobility;min assist:;ADL's;dressing;bathing  -MS     Existing Precautions/Restrictions fall  -MS     Barriers to Rehab cognitive status;previous functional deficit  -MS       Row Name 23 1445          Living Environment    People in Home sibling(s)  sister  -MS       Row Name 23 1445          Home Main Entrance    Number of Stairs, Main Entrance two  tub shower  -MS       Row Name 23 1445          Stairs Within Home, Primary    Number of Stairs, Within Home, Primary none  -MS       Row Name 23 1445          Cognition    Orientation Status  (Cognition) oriented to;person;place;situation;disoriented to;time  -MS       Row Name 11/14/23 1445          Safety Issues, Functional Mobility    Safety Issues Affecting Function (Mobility) impulsivity  -MS     Impairments Affecting Function (Mobility) balance  -MS               User Key  (r) = Recorded By, (t) = Taken By, (c) = Cosigned By      Initials Name Provider Type    MS Luevano Gila GUME, PT, DPT, NCS Physical Therapist                   Mobility       Row Name 11/14/23 1445          Bed Mobility    Bed Mobility supine-sit  -MS     Supine-Sit [place] (Bed Mobility) independent  -MS       Row Name 11/14/23 1445          Sit-Stand Transfer    Sit-Stand [place] (Transfers) contact guard  -MS     Comment, (Sit-Stand Transfer) posterior lean when first standing, second stand with use of RW with no posterior lean  -MS       Row Name 11/14/23 1445          Gait/Stairs (Locomotion)    [place] Level (Gait) contact guard;verbal cues;nonverbal cues (demo/gesture)  -MS     Assistive Device (Gait) walker, front-wheeled  -MS     Distance in Feet (Gait) 500ft verbal cues for postioning for RW  -MS               User Key  (r) = Recorded By, (t) = Taken By, (c) = Cosigned By      Initials Name Provider Type    MS Bassem Gila DUNAWAY, PT, DPT, NCS Physical Therapist                   Obj/Interventions       Row Name 11/14/23 1445          Range of Motion Comprehensive    General Range of Motion bilateral upper extremity ROM WFL;bilateral lower extremity ROM WFL  -MS       Row Name 11/14/23 1445          Strength Comprehensive (MMT)    General Manual Muscle Testing (MMT) Assessment no strength deficits identified  -MS       Row Name 11/14/23 1445          Balance    Balance Assessment sitting static balance;sitting dynamic balance;standing static balance;standing dynamic balance  -MS     Static Sitting Balance contact guard  -MS     Dynamic Sitting Balance contact guard  -MS     Position, Sitting Balance  supported;sitting edge of bed  -MS     Static Standing Balance contact guard  -MS     Dynamic Standing Balance contact guard  -MS     Position/Device Used, Standing Balance walker, rolling  -MS     Comment, Balance posterior lean in sitting, verbal cues to correct, pt does not maintain. Posterior lean in standing without UE support. No posterior lean with UE support  -MS               User Key  (r) = Recorded By, (t) = Taken By, (c) = Cosigned By      Initials Name Provider Type    MS Bassem Gila R, PT, DPT, NCS Physical Therapist                   Goals/Plan       Row Name 11/14/23 1410          Bed Mobility Goal 1 (PT)    Activity/Assistive Device (Bed Mobility Goal 1, PT) bed mobility activities, all  -MS     [place] Level/Cues Needed (Bed Mobility Goal 1, PT) independent  -MS     Time Frame (Bed Mobility Goal 1, PT) long term goal (LTG);by discharge  -MS     Progress/Outcomes (Bed Mobility Goal 1, PT) new goal  -MS       Row Name 11/14/23 4299          Transfer Goal 1 (PT)    Activity/Assistive Device (Transfer Goal 1, PT) sit-to-stand/stand-to-sit;bed-to-chair/chair-to-bed  -MS     [place] Level/Cues Needed (Transfer Goal 1, PT) independent  -MS     Time Frame (Transfer Goal 1, PT) long term goal (LTG);by discharge  -MS     Progress/Outcome (Transfer Goal 1, PT) new goal  -MS       Row Name 11/14/23 0908          Gait Training Goal 1 (PT)    Activity/Assistive Device (Gait Training Goal 1, PT) gait (walking locomotion);decrease fall risk;diminish gait deviation;improve balance and speed;increase endurance/gait distance  -MS     [place] Level (Gait Training Goal 1, PT) independent  -MS     Distance (Gait Training Goal 1, PT) 600ft  -MS     Time Frame (Gait Training Goal 1, PT) long term goal (LTG);by discharge  -MS     Progress/Outcome (Gait Training Goal 1, PT) new goal  -MS       Row Name 11/14/23 2601          Stairs Goal 1 (PT)    Activity/Assistive Device (Stairs Goal 1, PT) ascending  stairs;descending stairs  -MS     [place] Level/Cues Needed (Stairs Goal 1, PT) contact guard required  -MS     Number of Stairs (Stairs Goal 1, PT) 2  -MS     Time Frame (Stairs Goal 1, PT) long term goal (LTG);by discharge  -MS     Progress/Outcome (Stairs Goal 1, PT) new goal  -MS       Row Name 11/14/23 1445          Patient Education Goal (PT)    Progress/Outcome (Patient Education Goal, PT) new goal  -MS       Row Name 11/14/23 1445          Therapy Assessment/Plan (PT)    Planned Therapy Interventions (PT) balance training;bed mobility training;gait training;patient/family education;stair training;transfer training  -MS               User Key  (r) = Recorded By, (t) = Taken By, (c) = Cosigned By      Initials Name Provider Type    Gila Fernandez R, PT, DPT, NCS Physical Therapist                   Clinical Impression       Row Name 11/14/23 1445          Pain    Pretreatment Pain Rating 0/10 - no pain  -MS     Posttreatment Pain Rating 0/10 - no pain  -MS       Row Name 11/14/23 1446          Plan of Care Review    Plan of Care Reviewed With patient  -MS     Progress improving  -MS     Outcome Evaluation The patient presents alert and oriented x3 with cues. The patient demonstrates posterior lean in sitting and standing with no UE support. With UE support from RW he corrects the posterior lean. He does require cues for proper use of the RW at times, but it seems that he has never used a RW before. The patient lives by support of his sister and she assists him is every day activities due to his cognition. He was able to ambulate in the hallway with assist for navigation and he will benefit from doing this multiple times a day with nursing. PT will check back on him to work on balance to decrease his risk of falling. Recommend discharge home with his sister.  -MS       Row Name 11/14/23 1448          Therapy Assessment/Plan (PT)    Patient/Family Therapy Goals Statement (PT) go home  -MS     Rehab  Potential (PT) good, to achieve stated therapy goals  -MS     Criteria for Skilled Interventions Met (PT) yes;meets criteria;skilled treatment is necessary  -MS     Therapy Frequency (PT) 2 times/day  -MS     Predicted Duration of Therapy Intervention (PT) until discharge  -MS       Row Name 11/14/23 1445          Positioning and Restraints    Post Treatment Position chair  -MS     In Chair notified nsg;sitting;call light within reach;encouraged to call for assist;exit alarm on  -MS               User Key  (r) = Recorded By, (t) = Taken By, (c) = Cosigned By      Initials Name Provider Type    Gila Fernandez GUME, PT, DPT, NCS Physical Therapist                   Outcome Measures       Row Name 11/14/23 1445 11/14/23 0800       How much help from another person do you currently need...    Turning from your back to your side while in flat bed without using bedrails? 3  -MS 4  -CM    Moving from lying on back to sitting on the side of a flat bed without bedrails? 3  -MS 3  -CM    Moving to and from a bed to a chair (including a wheelchair)? 3  -MS 3  -CM    Standing up from a chair using your arms (e.g., wheelchair, bedside chair)? 3  -MS 3  -CM    Climbing 3-5 steps with a railing? 3  -MS 2  -CM    To walk in hospital room? 3  -MS 3  -CM    AM-PAC 6 Clicks Score (PT) 18  -MS 18  -CM    Highest Level of Mobility Goal 6 --> Walk 10 steps or more  -MS 6 --> Walk 10 steps or more  -CM      Row Name 11/14/23 1445          Functional Assessment    Outcome Measure Options AM-PAC 6 Clicks Basic Mobility (PT)  -MS               User Key  (r) = Recorded By, (t) = Taken By, (c) = Cosigned By      Initials Name Provider Type    MS Gila Luevano, PT, DPT, NCS Physical Therapist    Karmen Jang, RN Registered Nurse                                 Physical Therapy Education       Title: PT OT SLP Therapies (In Progress)       Topic: Physical Therapy (In Progress)       Point: Mobility training (In Progress)        Learning Progress Summary             Patient Acceptance, E, NR by MS at 11/14/2023 0446    Comment: role of PT in his care                         Point: Home exercise program (Not Started)       Learner Progress:  Not documented in this visit.              Point: Body mechanics (Not Started)       Learner Progress:  Not documented in this visit.              Point: Precautions (Not Started)       Learner Progress:  Not documented in this visit.                              User Key       Initials Effective Dates Name Provider Type Discipline    MS 07/11/23 -  Gila Luevano, PT, DPT, NCS Physical Therapist PT                  PT Recommendation and Plan  Planned Therapy Interventions (PT): balance training, bed mobility training, gait training, patient/family education, stair training, transfer training  Plan of Care Reviewed With: patient  Progress: improving  Outcome Evaluation: The patient presents alert and oriented x3 with cues. The patient demonstrates posterior lean in sitting and standing with no UE support. With UE support from RW he corrects the posterior lean. He does require cues for proper use of the RW at times, but it seems that he has never used a RW before. The patient lives by support of his sister and she assists him is every day activities due to his cognition. He was able to ambulate in the hallway with assist for navigation and he will benefit from doing this multiple times a day with nursing. PT will check back on him to work on balance to decrease his risk of falling. Recommend discharge home with his sister.     Time Calculation:         PT Charges       Row Name 11/14/23 1445             Time Calculation    Start Time 1445  -MS      Stop Time 1532  -MS      Time Calculation (min) 47 min  -MS      PT Received On 11/14/23  -MS      PT Goal Re-Cert Due Date 11/24/23  -MS         Untimed Charges    PT Eval/Re-eval Minutes 47  -MS         Total Minutes    Untimed Charges Total Minutes 47  -MS        Total Minutes 47  -MS                User Key  (r) = Recorded By, (t) = Taken By, (c) = Cosigned By      Initials Name Provider Type    Gila Fernandez, PT, DPT, NCS Physical Therapist                      PT G-Codes  Outcome Measure Options: AM-PAC 6 Clicks Basic Mobility (PT)  AM-PAC 6 Clicks Score (PT): 18  PT Discharge Summary  Anticipated Discharge Disposition (PT): home with assist    Gila Luevano, PT, DPT, NCS  11/14/2023

## 2023-11-14 NOTE — PAYOR COMM NOTE
"Raulito Melo (77 y.o. Male) [id number]     new  inpt admit   11/12   [facility] medicaid id  [id number]        icd 10 code K81.0  Arturo phone    fax           [phone]         Date of Birth   1945    Social Security Number       Address   [address] [place] [address]    Home Phone   [phone]    MRN   [id number]       Sabianist   [facility]    Marital Status   Single                            Admission Date   11/12/23    Admission Type   Emergency    Admitting Provider   Richardson Marina MD    Attending Provider   Richardson Marina MD    Department, Room/Bed   [facility] 3C, 389/1       Discharge Date       Discharge Disposition       Discharge Destination                                 Attending Provider: Richardson Marina MD    Allergies: No Known Allergies    Isolation: None   Infection: None   Code Status: CPR    Ht: 177.8 cm (70\")   Wt: 71.8 kg (158 lb 3.2 oz)    Admission Cmt: None   Principal Problem: Acute cholecystitis [K81.0]                   Active Insurance as of 11/12/2023       Primary Coverage       Payor Plan Insurance Group Employer/Plan Group    HUMANA MEDICARE REPLACEMENT HUMANA MEDICARE REPLACEMENT [id number]       Payor Plan Address Payor Plan Phone Number Payor Plan Fax Number Effective Dates    [address] [address] [phone]  2/1/2023 - None Entered    [facility] [id number]         Subscriber Name Subscriber Birth Date Member ID       RAULITO MELO 1945 [id number]               Secondary Coverage       Payor Plan Insurance Group Employer/Plan Group    [place]        Payor Plan Address Payor Plan Phone Number Payor Plan Fax Number Effective Dates    [address] [phone]  6/30/2023 - None Entered    [facility] [id number]         Subscriber Name Subscriber Birth Date Member ID       RAULITO MELO 1945 [id number]                     Emergency Contacts        (Rel.) " Home Phone Work Phone Mobile Phone    WILL GOEL (Sister) [phone] -- [phone]                 History & Physical        Elizabeth Chapa MD at 11/12/23 1643              [facility]  HISTORY AND PHYSICAL    Date of Admission: 11/12/2023  Primary Care Physician: Matt Garcia,     Subjective   Primary Historian: Patient and sister    Chief Complaint: abdominal pain, nausea and vomiting since yesterday    Vomiting       77 years old man with a history of asthma, never smoker, history of hypertension, GERD, dyslipidemia, prostate cancer posttreatment in 2015, came to ER with his sister complaining of right upper quadrant pain and periumbilical pain started yesterday, associated with mild abdominal distention, nausea and vomiting.  The sister noticed the patient has generalized weakness and unsteady gait.  No chills or fever, no diarrhea or constipation.  In ER the patient was found dehydrated, with CHARLEEN and slightly lactic acidosis, leukocytosis and neutrophilia, CT abdomen and pelvis showing suspected acute cholecystitis.  General surgery was consulted, will see the patient for possible cholecystectomy, recommended admission under hospitalist service.  In ER he was treated with 3 L LR fluid bolus for sepsis, started on antibiotic treatment with Zosyn, blood cultures were sent, did also with morphine and Zofran.  I examined the patient in the emergency room.  Currently feeling better, less abdominal pain after IV morphine.  Discussed with her sister present at bedside.        Review of Systems   Constitutional:  Positive for activity change.   HENT: Negative.     Eyes: Negative.    Respiratory: Negative.     Cardiovascular: Negative.    Gastrointestinal:  Positive for abdominal distention, nausea and vomiting.        Right upper quadrant pain and periumbilical pain since yesterday, associated with nausea and vomiting   Endocrine: Negative.     Genitourinary: Negative.    Musculoskeletal:  Positive for gait problem.        Unsteady gait for the last 2 days   Skin: Negative.    Allergic/Immunologic: Negative.    Hematological: Negative.    Psychiatric/Behavioral: Negative.        Otherwise complete ROS reviewed and negative except as mentioned in the HPI.    Past Medical History:   Past Medical History:   Diagnosis Date    Anxiety     Hypertension      Past Surgical History:  Past Surgical History:   Procedure Laterality Date    PROSTATE SURGERY       Social History:  reports that he has never smoked. He has never used smokeless tobacco. He reports that he does not drink alcohol and does not use drugs.    Family History: family history includes Asthma in his brother; Cancer in his brother and father; Diabetes in his mother; Heart disease in his mother; Kidney disease in his mother.       Allergies:  No Known Allergies    Medications:  Prior to Admission medications    Medication Sig Start Date End Date Taking? Authorizing Provider   albuterol (ACCUNEB) 1.25 MG/3ML nebulizer solution Take 3 mL by nebulization Every 6 (Six) Hours As Needed for Wheezing. 5/11/21   Nya Germain APRN   albuterol sulfate  (90 Base) MCG/ACT inhaler Inhale 2 puffs Every 4 (Four) Hours As Needed for Wheezing. 2/4/21   DENNIS Caputo MD   aspirin 81 MG EC tablet Take 1 tablet by mouth Daily. 6/30/20   DENNIS Caputo MD   benzonatate (TESSALON) 200 MG capsule Take 1 capsule by mouth 3 (Three) Times a Day As Needed for Cough. 12/30/22   DENNIS Caputo MD   budesonide (PULMICORT) 0.5 MG/2ML nebulizer solution Take 2 mL by nebulization Daily. 3/7/22   DENNIS Caputo MD   cetirizine (zyrTEC) 10 MG tablet Take 1 tablet by mouth Daily. 6/25/21   DENNIS Caputo MD   cholecalciferol (VITAMIN D3) 25 MCG (1000 UT) tablet Take 1 tablet by mouth Daily. 6/30/20   DENNIS Caputo MD   fluticasone-salmeterol (Advair HFA) 115-21 MCG/ACT inhaler Inhale 2 puffs 2  "(Two) Times a Day. 12/30/22   DENNIS Caputo MD   guaiFENesin (Mucinex) 600 MG 12 hr tablet Take 2 tablets by mouth 2 (Two) Times a Day. Take with full glass of water. 10/11/22   Nya Germain APRN   hydroCHLOROthiazide (MICROZIDE) 12.5 MG capsule Take 1 capsule by mouth Every Morning. 7/3/23   DENNIS Caputo MD   ipratropium-albuterol (DUO-NEB) 0.5-2.5 mg/3 ml nebulizer USE 1 VIAL IN NEBULIZER EVERY 6 HOURS 4/10/23   DENNIS Caputo MD   losartan (COZAAR) 50 MG tablet Take 1 tablet by mouth Daily. 7/3/23   DENNIS Caputo MD   montelukast (SINGULAIR) 10 MG tablet Take 1 tablet by mouth Every Night. 6/30/20   DENNIS Caputo MD   pantoprazole (PROTONIX) 40 MG EC tablet Take 1 tablet by mouth Daily. 7/3/23   DENNIS Caputo MD   simvastatin (ZOCOR) 20 MG tablet Take 1 tablet by mouth Every Night. 8/4/23   DENNIS Caputo MD   tamsulosin (FLOMAX) 0.4 MG capsule 24 hr capsule Take 1 capsule by mouth Daily. 7/3/23   DENNIS Caputo MD   tiotropium bromide monohydrate (Spiriva Respimat) 2.5 MCG/ACT aerosol solution inhaler Inhale 2 puffs Daily. 12/30/22   DENNIS Caputo MD   vitamin C (ASCORBIC ACID) 500 MG tablet Take 1 tablet by mouth Daily. 6/30/20   DENNIS Caputo MD     I have utilized all available immediate resources to obtain, update, or review the patient's current medications (including all prescriptions, over-the-counter products, herbals, cannabis/cannabidiol products, and vitamin/mineral/dietary (nutritional) supplements).    Objective     Vital Signs: /79   Pulse 101   Temp 98.3 °F (36.8 °C) (Oral)   Resp 18   Ht 177.8 cm (70\")   Wt 71.8 kg (158 lb 3.2 oz)   SpO2 93%   BMI 22.70 kg/m²   Physical Exam  Vitals reviewed.   Constitutional:       Appearance: He is normal weight.   HENT:      Head: Normocephalic and atraumatic.      Right Ear: External ear normal.      Left Ear: External ear normal.      Nose: Nose normal.      Mouth/Throat:      Mouth: Mucous " membranes are dry.      Pharynx: Oropharynx is clear.      Comments: Dry mucous membrane  Poor dentition  Eyes:      Extraocular Movements: Extraocular movements intact.      Conjunctiva/sclera: Conjunctivae normal.      Pupils: Pupils are equal, round, and reactive to light.   Cardiovascular:      Rate and Rhythm: Normal rate and regular rhythm.      Pulses: Normal pulses.      Heart sounds: Normal heart sounds.   Pulmonary:      Effort: Pulmonary effort is normal.      Breath sounds: Normal breath sounds.   Abdominal:      General: Bowel sounds are normal.      Palpations: Abdomen is soft.      Tenderness: There is abdominal tenderness.      Comments: Tenderness to deep palpation in RUQ   Musculoskeletal:         General: Normal range of motion.      Cervical back: Normal range of motion and neck supple.   Skin:     General: Skin is warm and dry.      Capillary Refill: Capillary refill takes less than 2 seconds.   Neurological:      General: No focal deficit present.      Mental Status: He is alert and oriented to person, place, and time. Mental status is at baseline.   Psychiatric:         Mood and Affect: Mood normal.         Behavior: Behavior normal.         Thought Content: Thought content normal.         Judgment: Judgment normal.              Results Reviewed:  Lab Results (last 24 hours)       Procedure Component Value Units Date/Time    Lactic Acid, Plasma [[id number]]  (Abnormal) Collected: 11/12/23 1547    Specimen: Blood Updated: 11/12/23 1613     Lactate 2.3 mmol/L     Blood Culture - Blood, Arm, Right [[id number]] Collected: 11/12/23 1547    Specimen: Blood from Arm, Right Updated: 11/12/23 1559    Blood Culture - Blood, Arm, Left [[id number]] Collected: 11/12/23 1547    Specimen: Blood from Arm, Left Updated: 11/12/23 1558    Comprehensive Metabolic Panel [[id number]]  (Abnormal) Collected: 11/12/23 1326    Specimen: Blood Updated: 11/12/23 1357     Glucose 161 mg/dL      BUN 45 mg/dL      Creatinine  2.78 mg/dL      Sodium 135 mmol/L      Potassium 3.2 mmol/L      Comment: Slight hemolysis detected by analyzer. Result may be falsely elevated.        Chloride 97 mmol/L      CO2 24.0 mmol/L      Calcium 9.2 mg/dL      Total Protein 7.4 g/dL      Albumin 3.7 g/dL      ALT (SGPT) 17 U/L      AST (SGOT) 23 U/L      Alkaline Phosphatase 72 U/L      Total Bilirubin 1.1 mg/dL      Globulin 3.7 gm/dL      A/G Ratio 1.0 g/dL      BUN/Creatinine Ratio 16.2     Anion Gap 14.0 mmol/L      eGFR 22.6 mL/min/1.73     Narrative:      GFR Normal >60  Chronic Kidney Disease <60  Kidney Failure <15    The GFR formula is only valid for adults with stable renal function between ages 18 and 70.    Magnesium [[id number]]  (Normal) Collected: 11/12/23 1326    Specimen: Blood Updated: 11/12/23 1353     Magnesium 2.2 mg/dL     Lipase [[id number]]  (Normal) Collected: 11/12/23 1326    Specimen: Blood Updated: 11/12/23 1352     Lipase 24 U/L     Protime-INR [[id number]]  (Abnormal) Collected: 11/12/23 1326    Specimen: Blood Updated: 11/12/23 1348     Protime 15.4 Seconds      INR 1.20    CBC & Differential [[id number]]  (Abnormal) Collected: 11/12/23 1326    Specimen: Blood Updated: 11/12/23 1339    Narrative:      The following orders were created for panel order CBC & Differential.  Procedure                               Abnormality         Status                     ---------                               -----------         ------                     CBC Auto Differential[[id number]]        Abnormal            Final result                 Please view results for these tests on the individual orders.    CBC Auto Differential [[id number]]  (Abnormal) Collected: 11/12/23 1326    Specimen: Blood Updated: 11/12/23 1339     WBC 21.49 10*3/mm3      RBC 4.53 10*6/mm3      Hemoglobin 14.4 g/dL      Hematocrit 42.9 %      MCV 94.7 fL      MCH 31.8 pg      MCHC 33.6 g/dL      RDW 13.0 %      RDW-SD 44.9 fl      MPV 10.2 fL      Platelets 197 10*3/mm3       Neutrophil % 86.9 %      Lymphocyte % 3.3 %      Monocyte % 8.9 %      Eosinophil % 0.0 %      Basophil % 0.2 %      Immature Grans % 0.7 %      Neutrophils, Absolute 18.65 10*3/mm3      Lymphocytes, Absolute 0.71 10*3/mm3      Monocytes, Absolute 1.92 10*3/mm3      Eosinophils, Absolute 0.01 10*3/mm3      Basophils, Absolute 0.04 10*3/mm3      Immature Grans, Absolute 0.16 10*3/mm3      nRBC 0.0 /100 WBC           Imaging Results (Last 24 Hours)       Procedure Component Value Units Date/Time    CT Abdomen Pelvis Without Contrast [[id number]] Collected: 11/12/23 1509     Updated: 11/12/23 1520    Narrative:      EXAM: CT ABDOMEN PELVIS WO CONTRAST-     INDICATION: abd pain, n/v, gfr less than 30        TECHNIQUE: Helically acquired CT images were obtained of the abdomen and  pelvis without intravenous contrast. Coronal and sagittal reformations  were performed.        DOSE LENGTH PRODUCT: 197 mGy cm. Automated exposure control was also  utilized to decrease patient radiation dose.     COMPARISON: Non available.     FINDINGS:     Lower Chest: Trace pericardial fluid. Mild atelectasis in the right lung  base     Liver: Unremarkable.     Biliary Tree: Gallbladder is distended with mild gallbladder wall  thickening and pericholecystic stranding.     Spleen: Unremarkable.     Pancreas: Unremarkable.     Adrenal Glands: Unremarkable.     Kidneys/Ureters/Bladder: Unremarkable.     Reproductive Organs: Unremarkable.     Gastrointestinal Tract: Colonic diverticulosis. Stranding about the  hepatic flexure of the large bowel, likely reactive. Appendix not  visualized, however no secondary signs of acute appendicitis.     Lymphatics: Unremarkable.     Vasculature: Mild to moderate atherosclerosis.     Peritoneum/Retroperitoneum: No additional findings     Abdominal Wall/Soft Tissues: Small fat-containing left inguinal hernia.     Osseous Structures: No acute or suspicious osseous findings.       Impression:             Findings suspicious for acute cholecystitis demonstrated by distended  gallbladder, mild gallbladder wall thickening, and pericholecystic  stranding.        This report was signed and finalized on 11/12/2023 3:17 PM CST by Selvin Zambrano.       XR Chest 1 View [[id number]] Collected: 11/12/23 1418     Updated: 11/12/23 1424    Narrative:      XR CHEST 1 VW- 11/12/2023 1:28 PM CST     HISTORY: ams       COMPARISON: None     FINDINGS:  Upright frontal radiograph of the chest was obtained     Airspace disease in the right lung base with obscuration of the right  hemidiaphragm. The cardiomediastinal silhouette and pulmonary  vascularity are within normal limits. The osseous structures and  surrounding soft tissues demonstrate no acute abnormality.       Impression:      1.  Right lower lobe airspace disease which obscures the right  hemidiaphragm, differential including pneumonia, atelectasis or perhaps  parenchymal scarring.     This report was signed and finalized on 11/12/2023 2:21 PM CST by Dr Rj Nuñez.             I have personally reviewed and interpreted the radiology studies and ECG obtained at time of admission.     Assessment / Plan   Assessment:   Active Hospital Problems    Diagnosis     **Acute cholecystitis        Treatment Plan  The patient will be admitted to my service here at [facility].    -Acute cholecystitis with severe sepsis present at admission with endorgan dysfunction-CHARLEEN.  Right upper quadrant pain for 2 days associated with nausea and vomiting.  Slightly sinus tachycardia, leukocytosis with neutrophilia, mildly elevated lactic acid and CHARLEEN.    Treated with 3 L LR fluid bolus in ER and started on antibiotic treatment with Zosyn.  Blood cultures were sent.  General surgery was consulted    We will keep the patient n.p.o. postmidnight  Continue antibiotic treatment with Zosyn  Medication and antiemetics as needed  Repeat lactic acid until back to normal    The patient does  not have a history of coronary artery disease or congestive heart failure, no chest pain or shortness of breath.  Oxygen saturation within normal limits on room air.  The patient is medical cleared for surgery, low risk for cardiovascular complications periop.    -CHARLEEN-prerenal secondary to dehydration poor p.o. intake versus ATN  Post liters LR fluid bolus in ER.  Monitor kidney function and urine output  Avoid nephrotoxic drugs  Continue with IV fluids with normal saline at 100 cc/h.  IV fluid bolus as needed for hypotension    -Hypokalemia-replacement    - h/o HTN.  Secondary to CHARLEEN and sepsis hold hydrochlorothiazide and Cozaar    -Dyslipidemia-on statin    -GERD, on PPI prior to admission. Started on Famotidine IV.    -History of asthma, persistent, without exacerbation, currently no wheezing or cough.  Oxygen saturation within normal limits on room air  Bronchodilators as needed  Pulmonary toilet and incentive spirometry    -History of prostate cancer posttreatment in 2015.  On Flomax    -Mild cognitive impairment.  The patient is very pleasant.  Lives with his sister  Supportive care    -Generalized weakness.  If not improving with hydration and antibiotic treatment may need PT and OT evaluation.    -History of prediabetes with last hemoglobin A1c 6.3.  Follow-up with PCP.    Discharge plan-likely return home with his sister when medical stable        Medical Decision Making  Number and Complexity of problems: 3      Conditions and Status        Stabilized in ER     MDM Data  External documents reviewed: yes  Cardiac tracing (EKG, telemetry) interpretation: yes  Radiology interpretation: yes  Labs reviewed: yes       Discussed with: one sister present at bedside, one sister over the phone     Care Planning  Shared decision making: yes  Code status and discussions: full code    Disposition  Social Determinants of Health that impact treatment or disposition: yes  Estimated length of stay is 3 days.     I  confirmed that the patient's advanced care plan is present, code status is documented, and a surrogate decision maker is listed in the patient's medical record.     The patient's surrogate decision maker is sister.     The patient was seen and examined by me on 11/12/2023 at 4:10 PM.    Electronically signed by Elizabeth Chapa MD, 11/12/23, 16:43 CST.               Electronically signed by Elizabeth Chapa MD at 11/12/23 1706          Emergency Department Notes        Zoe Yadav, RN at 11/12/23 1626          Nursing report ED to floor  Abhishek Mendieta  77 y.o.  male    HPI:   Chief Complaint   Patient presents with    Vomiting       Admitting doctor:   Elizabeth Chapa MD    Consulting provider(s):  Consults       No orders found from 10/14/2023 to 11/13/2023.             Admitting diagnosis:   The primary encounter diagnosis was Acute cholecystitis. A diagnosis of Acute kidney injury was also pertinent to this visit.    Code status:   Current Code Status       Date Active Code Status Order ID Comments User Context       Not on file            Allergies:   Patient has no known allergies.    Intake and Output    Intake/Output Summary (Last 24 hours) at 11/12/2023 1626  Last data filed at 11/12/2023 1357  Gross per 24 hour   Intake 1000 ml   Output --   Net 1000 ml       Weight:       11/12/23  1247   Weight: 71.8 kg (158 lb 3.2 oz)       Most recent vitals:   Vitals:    11/12/23 1512 11/12/23 1559 11/12/23 1613 11/12/23 1616   BP:  123/65  136/79   Pulse: 108 102 98 101   Resp:       Temp:       TempSrc:       SpO2: 98% 96% 95% 93%   Weight:       Height:         Oxygen Therapy: .    Active LDAs/IV Access:   Lines, Drains & Airways       Active LDAs       Name Placement date Placement time Site Days    Peripheral IV 11/12/23 1327 Left Antecubital 11/12/23  1327  Antecubital  less than 1    Peripheral IV 11/12/23 1555 Posterior;Right Hand 11/12/23  1555  Hand  less than 1                    Labs (abnormal  labs have a star):   Labs Reviewed   COMPREHENSIVE METABOLIC PANEL - Abnormal; Notable for the following components:       Result Value    Glucose 161 (*)     BUN 45 (*)     Creatinine 2.78 (*)     Sodium 135 (*)     Potassium 3.2 (*)     Chloride 97 (*)     eGFR 22.6 (*)     All other components within normal limits    Narrative:     GFR Normal >60  Chronic Kidney Disease <60  Kidney Failure <15    The GFR formula is only valid for adults with stable renal function between ages 18 and 70.   PROTIME-INR - Abnormal; Notable for the following components:    Protime 15.4 (*)     INR 1.20 (*)     All other components within normal limits   CBC WITH AUTO DIFFERENTIAL - Abnormal; Notable for the following components:    WBC 21.49 (*)     Neutrophil % 86.9 (*)     Lymphocyte % 3.3 (*)     Eosinophil % 0.0 (*)     Immature Grans % 0.7 (*)     Neutrophils, Absolute 18.65 (*)     Monocytes, Absolute 1.92 (*)     Immature Grans, Absolute 0.16 (*)     All other components within normal limits   LACTIC ACID, PLASMA - Abnormal; Notable for the following components:    Lactate 2.3 (*)     All other components within normal limits   LIPASE - Normal   MAGNESIUM - Normal   BLOOD CULTURE   BLOOD CULTURE   LACTIC ACID, REFLEX   CBC AND DIFFERENTIAL    Narrative:     The following orders were created for panel order CBC & Differential.  Procedure                               Abnormality         Status                     ---------                               -----------         ------                     CBC Auto Differential[[id number]]        Abnormal            Final result                 Please view results for these tests on the individual orders.       Meds given in ED:   Medications   sodium chloride 0.9 % flush 10 mL (has no administration in time range)   ondansetron (ZOFRAN) injection 4 mg (4 mg Intravenous Given 11/12/23 1329)   famotidine (PEPCID) injection 20 mg (20 mg Intravenous Given 11/12/23 1331)   lactated ringers  bolus 1,000 mL (0 mL Intravenous Stopped 11/12/23 1357)   lactated ringers bolus 1,000 mL (1,000 mL Intravenous New Bag 11/12/23 1522)   morphine injection 4 mg (4 mg Intravenous Given 11/12/23 1524)   piperacillin-tazobactam (ZOSYN) IVPB 3.375 g in 100 mL NS (CD) (3.375 g Intravenous New Bag 11/12/23 1556)   lactated ringers bolus 1,000 mL (1,000 mL Intravenous New Bag 11/12/23 1554)           NIH Stroke Scale:       Isolation/Infection(s):  No active isolations   No active infections     COVID Testing  Collected .  Resulted .    Nursing report ED to floor:  Mental status: A & O x1  Ambulatory status: Assist x1  Precautions: .    ED nurse phone extentsion- ..      Electronically signed by Zoe Yadav RN at 11/12/23 1626       Inocente Rudd MD at 11/12/23 1330          Subjective   History of Present Illness  77-year-old male presents to the emergency department with complaint of abdominal pain, nausea and vomiting.  History of hypertension, hld, asthma and anxiety, cognitive impairment.  History provided mainly by the sister who is present at the bedside.  Sister states that patient has had generalized weakness, nausea and vomiting since yesterday.  Has had several episodes of nonbloody nonbilious emesis.  Has been complaining of abdominal pain.  No reports of diarrhea.  Sister states that patient slept all day yesterday which is unusual.  Last night noticed he had some difficulty maintaining a normal balance while ambulating.  Last time he was able to walk normally was 2 days ago.  No reports of facial asymmetry, slurred speech, unilateral numbness or weakness.  Patient complains of abdominal pain and nausea.  Unable to obtain additional information.    History provided by:  Patient and relative      Review of Systems   All other systems reviewed and are negative.      Past Medical History:   Diagnosis Date    Anxiety     Hypertension        No Known Allergies    Past Surgical History:   Procedure  Laterality Date    PROSTATE SURGERY         Family History   Problem Relation Age of Onset    Diabetes Mother     Heart disease Mother     Kidney disease Mother     Cancer Father     Asthma Brother     Cancer Brother        Social History     Socioeconomic History    Marital status: Single   Tobacco Use    Smoking status: Never    Smokeless tobacco: Never   Substance and Sexual Activity    Alcohol use: Never    Drug use: Never    Sexual activity: Defer           Objective   Physical Exam  Vitals and nursing note reviewed.   Constitutional:       General: He is not in acute distress.     Appearance: He is normal weight.   HENT:      Head: Normocephalic and atraumatic.      Nose: Nose normal. No congestion or rhinorrhea.      Mouth/Throat:      Mouth: Mucous membranes are moist.      Pharynx: No oropharyngeal exudate or posterior oropharyngeal erythema.   Eyes:      Extraocular Movements: Extraocular movements intact.      Conjunctiva/sclera: Conjunctivae normal.      Pupils: Pupils are equal, round, and reactive to light.   Cardiovascular:      Rate and Rhythm: Normal rate and regular rhythm.      Heart sounds: Normal heart sounds. No murmur heard.  Pulmonary:      Effort: Pulmonary effort is normal.      Breath sounds: Normal breath sounds. No wheezing, rhonchi or rales.   Abdominal:      Tenderness: There is abdominal tenderness. There is guarding. There is no rebound.      Comments: Tenderness and voluntary guarding in the right upper and right lower quadrant   Musculoskeletal:         General: No swelling or tenderness.   Skin:     General: Skin is warm and dry.      Capillary Refill: Capillary refill takes less than 2 seconds.   Neurological:      General: No focal deficit present.      Mental Status: He is alert and oriented to person, place, and time. Mental status is at baseline.         Procedures      Lab Results (last 24 hours)       Procedure Component Value Units Date/Time    CBC & Differential  [[id number]]  (Abnormal) Collected: 11/12/23 1326    Specimen: Blood Updated: 11/12/23 1339    Narrative:      The following orders were created for panel order CBC & Differential.  Procedure                               Abnormality         Status                     ---------                               -----------         ------                     CBC Auto Differential[[id number]]        Abnormal            Final result                 Please view results for these tests on the individual orders.    Comprehensive Metabolic Panel [[id number]]  (Abnormal) Collected: 11/12/23 1326    Specimen: Blood Updated: 11/12/23 1357     Glucose 161 mg/dL      BUN 45 mg/dL      Creatinine 2.78 mg/dL      Sodium 135 mmol/L      Potassium 3.2 mmol/L      Comment: Slight hemolysis detected by analyzer. Result may be falsely elevated.        Chloride 97 mmol/L      CO2 24.0 mmol/L      Calcium 9.2 mg/dL      Total Protein 7.4 g/dL      Albumin 3.7 g/dL      ALT (SGPT) 17 U/L      AST (SGOT) 23 U/L      Alkaline Phosphatase 72 U/L      Total Bilirubin 1.1 mg/dL      Globulin 3.7 gm/dL      A/G Ratio 1.0 g/dL      BUN/Creatinine Ratio 16.2     Anion Gap 14.0 mmol/L      eGFR 22.6 mL/min/1.73     Narrative:      GFR Normal >60  Chronic Kidney Disease <60  Kidney Failure <15    The GFR formula is only valid for adults with stable renal function between ages 18 and 70.    Protime-INR [[id number]]  (Abnormal) Collected: 11/12/23 1326    Specimen: Blood Updated: 11/12/23 1348     Protime 15.4 Seconds      INR 1.20    Lipase [[id number]]  (Normal) Collected: 11/12/23 1326    Specimen: Blood Updated: 11/12/23 1352     Lipase 24 U/L     Magnesium [[id number]]  (Normal) Collected: 11/12/23 1326    Specimen: Blood Updated: 11/12/23 1353     Magnesium 2.2 mg/dL     CBC Auto Differential [[id number]]  (Abnormal) Collected: 11/12/23 1326    Specimen: Blood Updated: 11/12/23 1339     WBC 21.49 10*3/mm3      RBC 4.53 10*6/mm3      Hemoglobin 14.4 g/dL       Hematocrit 42.9 %      MCV 94.7 fL      MCH 31.8 pg      MCHC 33.6 g/dL      RDW 13.0 %      RDW-SD 44.9 fl      MPV 10.2 fL      Platelets 197 10*3/mm3      Neutrophil % 86.9 %      Lymphocyte % 3.3 %      Monocyte % 8.9 %      Eosinophil % 0.0 %      Basophil % 0.2 %      Immature Grans % 0.7 %      Neutrophils, Absolute 18.65 10*3/mm3      Lymphocytes, Absolute 0.71 10*3/mm3      Monocytes, Absolute 1.92 10*3/mm3      Eosinophils, Absolute 0.01 10*3/mm3      Basophils, Absolute 0.04 10*3/mm3      Immature Grans, Absolute 0.16 10*3/mm3      nRBC 0.0 /100 WBC     Blood Culture - Blood, Arm, Left [[id number]] Collected: 11/12/23 1547    Specimen: Blood from Arm, Left Updated: 11/12/23 1558    Blood Culture - Blood, Arm, Right [[id number]] Collected: 11/12/23 1547    Specimen: Blood from Arm, Right Updated: 11/12/23 1559    Lactic Acid, Plasma [[id number]]  (Abnormal) Collected: 11/12/23 1547    Specimen: Blood Updated: 11/12/23 1613     Lactate 2.3 mmol/L          CT Abdomen Pelvis Without Contrast    Result Date: 11/12/2023  EXAM: CT ABDOMEN PELVIS WO CONTRAST-  INDICATION: abd pain, n/v, gfr less than 30    TECHNIQUE: Helically acquired CT images were obtained of the abdomen and pelvis without intravenous contrast. Coronal and sagittal reformations were performed.    DOSE LENGTH PRODUCT: 197 mGy cm. Automated exposure control was also utilized to decrease patient radiation dose.  COMPARISON: Non available.  FINDINGS:  Lower Chest: Trace pericardial fluid. Mild atelectasis in the right lung base  Liver: Unremarkable.  Biliary Tree: Gallbladder is distended with mild gallbladder wall thickening and pericholecystic stranding.  Spleen: Unremarkable.  Pancreas: Unremarkable.  Adrenal Glands: Unremarkable.  Kidneys/Ureters/Bladder: Unremarkable.  Reproductive Organs: Unremarkable.  Gastrointestinal Tract: Colonic diverticulosis. Stranding about the hepatic flexure of the large bowel, likely reactive. Appendix not  visualized, however no secondary signs of acute appendicitis.  Lymphatics: Unremarkable.  Vasculature: Mild to moderate atherosclerosis.  Peritoneum/Retroperitoneum: No additional findings  Abdominal Wall/Soft Tissues: Small fat-containing left inguinal hernia.  Osseous Structures: No acute or suspicious osseous findings.          Findings suspicious for acute cholecystitis demonstrated by distended gallbladder, mild gallbladder wall thickening, and pericholecystic stranding.   This report was signed and finalized on 11/12/2023 3:17 PM CST by Selvin Zambrano.      XR Chest 1 View    Result Date: 11/12/2023  XR CHEST 1 VW- 11/12/2023 1:28 PM CST  HISTORY: ams   COMPARISON: None  FINDINGS: Upright frontal radiograph of the chest was obtained  Airspace disease in the right lung base with obscuration of the right hemidiaphragm. The cardiomediastinal silhouette and pulmonary vascularity are within normal limits. The osseous structures and surrounding soft tissues demonstrate no acute abnormality.      1.  Right lower lobe airspace disease which obscures the right hemidiaphragm, differential including pneumonia, atelectasis or perhaps parenchymal scarring.  This report was signed and finalized on 11/12/2023 2:21 PM CST by Dr Rj Nuñez.        ED Course  ED Course as of 11/12/23 1613   Sun Nov 12, 2023   1603 77-year-old male with history of hypertension, hyperlipidemia, asthma, anxiety, felt mental delay who presents to the ED with 2-day history of abdominal pain, nausea, vomiting, decreased appetite, generalized weakness.  No fever in the ED.  White count 21,000 lipase normal, lactate pending..  Tender in the right upper quadrant right lower quadrant on exam.  CT ordered, reveals findings consistent with acute cholecystitis.  Received dose of Zosyn.  Chest x-ray ordered for preop purposes, revealed over past in the right lower lung field.  Likely atelectasis as this was seen on CT.  Doubt pneumonia as patient does not  have any cough, congestion, shortness of breath hypoxia.    Blood cultures and lactate sent.  Ordered 2 L of normal saline.  Zosyn ordered.    Also has flower, likely from dehydration. Will continue hydration.  Spoke to general surgery, Dr. Roman.  Given patient's comorbidities and acute kidney injury request admit to the hospital service.  Plan for cholecystectomy tomorrow.  Case discussed with hospitalist who have graciously excepted the patient for admission.   [AW]      ED Course User Index  [AW] Inocente Rudd MD                                           Medical Decision Making  Amount and/or Complexity of Data Reviewed  Labs: ordered.  Radiology: ordered.    Risk  Prescription drug management.        Final diagnoses:   Acute cholecystitis   Acute kidney injury       ED Disposition  ED Disposition       ED Disposition   Decision to Admit    Condition   --    Comment   Level of Care: Med/Surg [1]   Diagnosis: Acute cholecystitis [575.0.ICD-9-CM]   Admitting Physician: KITA FRAGA [[id number]]   Attending Physician: KITA FRAGA [[id number]]   Certification: I Certify That Inpatient Hospital Services Are Medically Necessary For Greater Than 2 Midnights                 No follow-up provider specified.       Medication List      No changes were made to your prescriptions during this visit.            Inocente Rudd MD  11/12/23 1613      Electronically signed by Inocente Rudd MD at 11/12/23 1613       Current Facility-Administered Medications   Medication Dose Route Frequency Provider Last Rate Last Admin    acetaminophen (TYLENOL) tablet 650 mg  650 mg Oral Q6H PRN Richardson Marina MD        albuterol (PROVENTIL) nebulizer solution 0.042% 1.25 mg/3mL  1.25 mg Nebulization Q4H - RT Richardson Marina MD   1.25 mg at 11/14/23 0729    albuterol (PROVENTIL) nebulizer solution 0.083% 2.5 mg/3mL  2.5 mg Nebulization Q6H PRN Richardson Marina MD   2.5 mg at 11/13/23 2113    atorvastatin  (LIPITOR) tablet 20 mg  20 mg Oral Daily Richardson Marina MD   20 mg at 11/14/23 0830    sennosides-docusate (PERICOLACE) 8.6-50 MG per tablet 2 tablet  2 tablet Oral BID Richardson Marina MD   2 tablet at 11/14/23 0831    And    polyethylene glycol (MIRALAX) packet 17 g  17 g Oral Daily PRN Richardson Marina MD        And    bisacodyl (DULCOLAX) EC tablet 5 mg  5 mg Oral Daily PRN Richardson Marina MD        And    bisacodyl (DULCOLAX) suppository 10 mg  10 mg Rectal Daily PRN Richardson Marina MD        budesonide-formoterol (SYMBICORT) 160-4.5 MCG/ACT inhaler 1 puff  1 puff Inhalation BID - RT Richardson Marina MD   1 puff at 11/14/23 0736    cetirizine (zyrTEC) tablet 10 mg  10 mg Oral Daily Richardson Marina MD   10 mg at 11/14/23 0830    famotidine (PEPCID) injection 20 mg  20 mg Intravenous Daily Elizabeth Chapa MD   20 mg at 11/14/23 0831    morphine injection 4 mg  4 mg Intravenous Q4H PRN Richardson Marina MD        Morphine sulfate (PF) injection 2 mg  2 mg Intravenous Q4H PRN Richardson Marina MD        ondansetron (ZOFRAN) injection 4 mg  4 mg Intravenous Q6H PRN Richardson Marina MD   4 mg at 11/14/23 0247    piperacillin-tazobactam (ZOSYN) 3.375 g in iso-osmotic dextrose 50 ml (premix)  3.375 g Intravenous Q8H Elizabeth Chapa MD   3.375 g at 11/14/23 0830    [MAR Hold] sodium chloride 0.9 % flush 10 mL  10 mL Intravenous PRN Inocente Rudd MD        [MAR Hold] sodium chloride 0.9 % flush 10 mL  10 mL Intravenous PRN Elizabeth Chapa MD        [MAR Hold] sodium chloride 0.9 % infusion 40 mL  40 mL Intravenous PRN Elizabeth Chapa MD        sodium chloride 0.9 % infusion  100 mL/hr Intravenous Continuous Elizabeth Chapa  mL/hr at 11/14/23 0839 100 mL/hr at 11/14/23 0839    tamsulosin (FLOMAX) 24 hr capsule 0.4 mg  0.4 mg Oral Daily Richardson Marina MD   0.4 mg at 11/14/23 0831        Physician Progress Notes (last 48 hours)        Carmelina Roman MD at 11/14/23 0848           [facility]   Progress Note    Patient Name: Abhishek Mendieta  : 1945  MRN: [id number]  Primary Care Physician:  Matt Garcia DO  Date of admission: 2023    Subjective   Subjective    Tolerating diet. No n/v.         Objective     Vitals:   Temp:  [97.7 °F (36.5 °C)-98.7 °F (37.1 °C)] 98.4 °F (36.9 °C)  Heart Rate:  [50-98] 87  Resp:  [14-20] 16  BP: (116-138)/(60-87) 133/60  Flow (L/min):  [1-10] 1  FiO2 (%):  [100 %] 100 %  Physical Exam    Constitutional: Awake, alert   Eyes: PERRLA, sclerae anicteric, no conjunctival injection   HENT: NCAT, mucous membranes moist   Neck: Supple, no thyromegaly, no lymphadenopathy, trachea midline   Respiratory: Clear to auscultation bilaterally, nonlabored respirations    Cardiovascular: RRR, no murmurs, rubs, or gallops, palpable pedal pulses bilaterally   Gastrointestinal: Positive bowel sounds, soft, nontender, nondistended. Drain serosanguinous   Musculoskeletal: No bilateral ankle edema, no clubbing or cyanosis to extremities   Psychiatric: Appropriate affect, cooperative   Neurologic: Oriented x 3, strength symmetric in all extremities, Cranial Nerves grossly intact to confrontation, speech clear   Skin: No rashes     Result Review    Result Review:  I have personally reviewed the results from the time of this admission to 2023 08:48 CST and agree with these findings:  [x]  Laboratory list / accordion  [x]  Microbiology  []  Radiology  []  EKG/Telemetry   []  Cardiology/Vascular   []  Pathology  []  Old records  []  Other:        Assessment & Plan   Assessment / Plan     Brief Patient Summary:  Abhishek Mendieta is a 77 y.o. male who is s/p lap cholecystectomy    Active Hospital Problems:  Active Hospital Problems    Diagnosis     **Acute cholecystitis      Plan:    Regular diet  D/c klarissa drain  May d/c home with outpatient f/u    DVT prophylaxis:  Mechanical DVT prophylaxis orders are present.    CODE STATUS:   Level Of Support Discussed  With: Patient  Code Status (Patient has no pulse and is not breathing): CPR (Attempt to Resuscitate)  Medical Interventions (Patient has pulse or is breathing): Full Support        MD Carmelina Thayer MD  General Surgery  23  08:48 CST             Electronically signed by Carmelina Roman MD at 23 0849       Richardson Marina MD at 23 1858          I made attempt to see the patient in the room however she was down in the OR for surgery.    Electronically signed by Richardson Marina MD at 23 1859          Consult Notes (last 48 hours)        Carmelina Roman MD at 23 0910              [facility] General Surgery Services - Consult Note    Patient Name: Abhishek Mendieta  : 1945  MRN: [id number]  Primary Care Physician:  Matt Garcia DO  Referring Physician: Inocente Rudd MD  Date of admission: 2023    Consults    Subjective      Reason for Consult/ Chief Complaint: abdominal pain    History of Present Illness: Abhishek Mendieta is a 77 y.o. male who presents with two day history of abdominal pain. Reports RUQ and epigastric pain, moderate intensity, non-radiating. Associated nausea/vomiting. No chest pains/SOB.     ROS abdominal pain    Personal History     Past Medical History:   Diagnosis Date    Anxiety     Hypertension        Past Surgical History:   Procedure Laterality Date    PROSTATE SURGERY         Family History: family history includes Asthma in his brother; Cancer in his brother and father; Diabetes in his mother; Heart disease in his mother; Kidney disease in his mother. Otherwise pertinent FHx was reviewed and not pertinent to current issue.    Social History:  reports that he has never smoked. He has never used smokeless tobacco. He reports that he does not drink alcohol and does not use drugs.    Home Medications:   albuterol, albuterol sulfate HFA, aspirin, benzonatate, budesonide, cetirizine, cholecalciferol,  fluticasone-salmeterol, guaiFENesin, hydroCHLOROthiazide, ipratropium-albuterol, losartan, montelukast, pantoprazole, simvastatin, tamsulosin, tiotropium bromide monohydrate, and vitamin C    Allergies:  No Known Allergies      Objective      Vitals:  Temp:  [97.8 °F (36.6 °C)-98.9 °F (37.2 °C)] 98 °F (36.7 °C)  Heart Rate:  [] 52  Resp:  [16-20] 16  BP: (107-137)/(58-79) 137/63    Physical Exam  Constitutional:       General: He is not in acute distress.     Appearance: Normal appearance.   HENT:      Head: Normocephalic and atraumatic.   Eyes:      Extraocular Movements: Extraocular movements intact.      Pupils: Pupils are equal, round, and reactive to light.   Cardiovascular:      Rate and Rhythm: Normal rate and regular rhythm.      Pulses: Normal pulses.   Pulmonary:      Effort: Pulmonary effort is normal. No respiratory distress.      Breath sounds: Normal breath sounds.   Abdominal:      General: Bowel sounds are normal. There is no distension.      Palpations: Abdomen is soft. RUQ TTP   Neurological:      Mental Status: He is alert.   Psychiatric:         Mood and Affect: Mood normal.         Behavior: Behavior normal.     Result Review    Result Review:  I have personally reviewed the results from the time of this admission to 11/13/2023 09:10 CST and agree with these findings:  [x]  Laboratory  []  Microbiology  [x]  Radiology  []  EKG/Telemetry   []  Cardiology/Vascular   []  Pathology  []  Old records  []  Other:        Assessment & Plan        Active Hospital Problems:  Active Hospital Problems    Diagnosis     **Acute cholecystitis      Plan:   To OR for lap cholecystectomy  NPO  IVF  IV antibiotics      Carmelina Roman MD  General Surgery  11/13/23  09:10 CST       Electronically signed by Carmelina Roman MD at 11/13/23 0911

## 2023-11-14 NOTE — PROGRESS NOTES
[facility]   Progress Note    Patient Name: Abhishek Mendieta  : 1945  MRN: [id number]  Primary Care Physician:  Matt Garcia DO  Date of admission: 2023    Subjective   Subjective    Tolerating diet. No n/v.         Objective     Vitals:   Temp:  [97.7 °F (36.5 °C)-98.7 °F (37.1 °C)] 98.4 °F (36.9 °C)  Heart Rate:  [50-98] 87  Resp:  [14-20] 16  BP: (116-138)/(60-87) 133/60  Flow (L/min):  [1-10] 1  FiO2 (%):  [100 %] 100 %  Physical Exam    Constitutional: Awake, alert   Eyes: PERRLA, sclerae anicteric, no conjunctival injection   HENT: NCAT, mucous membranes moist   Neck: Supple, no thyromegaly, no lymphadenopathy, trachea midline   Respiratory: Clear to auscultation bilaterally, nonlabored respirations    Cardiovascular: RRR, no murmurs, rubs, or gallops, palpable pedal pulses bilaterally   Gastrointestinal: Positive bowel sounds, soft, nontender, nondistended. Drain serosanguinous   Musculoskeletal: No bilateral ankle edema, no clubbing or cyanosis to extremities   Psychiatric: Appropriate affect, cooperative   Neurologic: Oriented x 3, strength symmetric in all extremities, Cranial Nerves grossly intact to confrontation, speech clear   Skin: No rashes     Result Review    Result Review:  I have personally reviewed the results from the time of this admission to 2023 08:48 CST and agree with these findings:  [x]  Laboratory list / accordion  [x]  Microbiology  []  Radiology  []  EKG/Telemetry   []  Cardiology/Vascular   []  Pathology  []  Old records  []  Other:        Assessment & Plan   Assessment / Plan     Brief Patient Summary:  Abhishek Mendieta is a 77 y.o. male who is s/p lap cholecystectomy    Active Hospital Problems:  Active Hospital Problems    Diagnosis     **Acute cholecystitis      Plan:    Regular diet  D/c klarissa drain  May d/c home with outpatient f/u    DVT prophylaxis:  Mechanical DVT prophylaxis orders are present.    CODE STATUS:   Level Of Support Discussed With:  Patient  Code Status (Patient has no pulse and is not breathing): CPR (Attempt to Resuscitate)  Medical Interventions (Patient has pulse or is breathing): Full Support        MD Carmelina Thayer MD  General Surgery  11/14/23  08:48 CST

## 2023-11-14 NOTE — PLAN OF CARE
Goal Outcome Evaluation:  Plan of Care Reviewed With: patient        Progress: improving  Outcome Evaluation: The patient presents alert and oriented x3 with cues. The patient demonstrates posterior lean in sitting and standing with no UE support. With UE support from RW he corrects the posterior lean. He does require cues for proper use of the RW at times, but it seems that he has never used a RW before. The patient lives by support of his sister and she assists him is every day activities due to his cognition. He was able to ambulate in the hallway with assist for navigation and he will benefit from doing this multiple times a day with nursing. PT will check back on him to work on balance to decrease his risk of falling. Recommend discharge home with his sister.      Anticipated Discharge Disposition (PT): home with assist

## 2023-11-14 NOTE — PLAN OF CARE
Goal Outcome Evaluation:      CINDY and F/C removed today. Voiding. Continuing maintenance IV fluids and IV antibiotic-Zosyn. Advanced to regular diet. Tolerating diet. Physical therapy consulted. Pt evaluated and ambulated with physical therapy's assistance. Currently sitting up in the chair. SCD. Will continue to monitor pt.

## 2023-11-15 ENCOUNTER — READMISSION MANAGEMENT (OUTPATIENT)
Dept: CALL CENTER | Facility: HOSPITAL | Age: 78
End: 2023-11-15
Payer: MEDICARE

## 2023-11-15 VITALS
TEMPERATURE: 97.7 F | DIASTOLIC BLOOD PRESSURE: 58 MMHG | HEART RATE: 72 BPM | WEIGHT: 158.2 LBS | HEIGHT: 70 IN | SYSTOLIC BLOOD PRESSURE: 128 MMHG | BODY MASS INDEX: 22.65 KG/M2 | RESPIRATION RATE: 16 BRPM | OXYGEN SATURATION: 98 %

## 2023-11-15 LAB
ALBUMIN SERPL-MCNC: 2.6 G/DL (ref 3.5–5.2)
ANION GAP SERPL CALCULATED.3IONS-SCNC: 8 MMOL/L (ref 5–15)
BUN SERPL-MCNC: 15 MG/DL (ref 8–23)
BUN/CREAT SERPL: 17.4 (ref 7–25)
CALCIUM SPEC-SCNC: 8.1 MG/DL (ref 8.6–10.5)
CHLORIDE SERPL-SCNC: 103 MMOL/L (ref 98–107)
CO2 SERPL-SCNC: 27 MMOL/L (ref 22–29)
CREAT SERPL-MCNC: 0.86 MG/DL (ref 0.76–1.27)
DEPRECATED RDW RBC AUTO: 44.1 FL (ref 37–54)
EGFRCR SERPLBLD CKD-EPI 2021: 88.6 ML/MIN/1.73
ERYTHROCYTE [DISTWIDTH] IN BLOOD BY AUTOMATED COUNT: 12.5 % (ref 12.3–15.4)
GLUCOSE SERPL-MCNC: 98 MG/DL (ref 65–99)
HCT VFR BLD AUTO: 34.6 % (ref 37.5–51)
HGB BLD-MCNC: 11.4 G/DL (ref 13–17.7)
MAGNESIUM SERPL-MCNC: 1.8 MG/DL (ref 1.6–2.4)
MCH RBC QN AUTO: 31.3 PG (ref 26.6–33)
MCHC RBC AUTO-ENTMCNC: 32.9 G/DL (ref 31.5–35.7)
MCV RBC AUTO: 95.1 FL (ref 79–97)
PHOSPHATE SERPL-MCNC: 1.9 MG/DL (ref 2.5–4.5)
PLATELET # BLD AUTO: 214 10*3/MM3 (ref 140–450)
PMV BLD AUTO: 9.9 FL (ref 6–12)
POTASSIUM SERPL-SCNC: 3 MMOL/L (ref 3.5–5.2)
RBC # BLD AUTO: 3.64 10*6/MM3 (ref 4.14–5.8)
SODIUM SERPL-SCNC: 138 MMOL/L (ref 136–145)
WBC NRBC COR # BLD: 10.5 10*3/MM3 (ref 3.4–10.8)

## 2023-11-15 PROCEDURE — 80069 RENAL FUNCTION PANEL: CPT | Performed by: INTERNAL MEDICINE

## 2023-11-15 PROCEDURE — 85027 COMPLETE CBC AUTOMATED: CPT | Performed by: INTERNAL MEDICINE

## 2023-11-15 PROCEDURE — 94664 DEMO&/EVAL PT USE INHALER: CPT

## 2023-11-15 PROCEDURE — 25810000003 SODIUM CHLORIDE 0.9 % SOLUTION: Performed by: HOSPITALIST

## 2023-11-15 PROCEDURE — 97535 SELF CARE MNGMENT TRAINING: CPT

## 2023-11-15 PROCEDURE — 94799 UNLISTED PULMONARY SVC/PX: CPT

## 2023-11-15 PROCEDURE — 83735 ASSAY OF MAGNESIUM: CPT | Performed by: INTERNAL MEDICINE

## 2023-11-15 PROCEDURE — 97116 GAIT TRAINING THERAPY: CPT

## 2023-11-15 PROCEDURE — 25010000002 PIPERACILLIN SOD-TAZOBACTAM PER 1 G: Performed by: HOSPITALIST

## 2023-11-15 RX ADMIN — ALBUTEROL SULFATE 1.25 MG: 1.25 SOLUTION RESPIRATORY (INHALATION) at 06:45

## 2023-11-15 RX ADMIN — BUDESONIDE AND FORMOTEROL FUMARATE DIHYDRATE 1 PUFF: 160; 4.5 AEROSOL RESPIRATORY (INHALATION) at 06:45

## 2023-11-15 RX ADMIN — FAMOTIDINE 20 MG: 10 INJECTION INTRAVENOUS at 08:14

## 2023-11-15 RX ADMIN — ALBUTEROL SULFATE 1.25 MG: 1.25 SOLUTION RESPIRATORY (INHALATION) at 10:45

## 2023-11-15 RX ADMIN — ATORVASTATIN CALCIUM 20 MG: 10 TABLET, FILM COATED ORAL at 08:14

## 2023-11-15 RX ADMIN — POTASSIUM & SODIUM PHOSPHATES POWDER PACK 280-160-250 MG 2 PACKET: 280-160-250 PACK at 08:00

## 2023-11-15 RX ADMIN — ALBUTEROL SULFATE 1.25 MG: 1.25 SOLUTION RESPIRATORY (INHALATION) at 03:15

## 2023-11-15 RX ADMIN — TAMSULOSIN HYDROCHLORIDE 0.4 MG: 0.4 CAPSULE ORAL at 08:14

## 2023-11-15 RX ADMIN — CETIRIZINE HYDROCHLORIDE 10 MG: 10 TABLET ORAL at 08:14

## 2023-11-15 RX ADMIN — PIPERACILLIN SODIUM AND TAZOBACTAM SODIUM 3.38 G: 3; .375 INJECTION, SOLUTION INTRAVENOUS at 08:14

## 2023-11-15 RX ADMIN — SODIUM CHLORIDE 100 ML/HR: 9 INJECTION, SOLUTION INTRAVENOUS at 02:11

## 2023-11-15 RX ADMIN — DOCUSATE SODIUM 50 MG AND SENNOSIDES 8.6 MG 2 TABLET: 8.6; 5 TABLET, FILM COATED ORAL at 08:13

## 2023-11-15 NOTE — PROGRESS NOTES
[facility]   Progress Note    Patient Name: Abhishek Mendieta  : 1945  MRN: [id number]  Primary Care Physician:  Matt Garcia DO  Date of admission: 2023    Subjective   Subjective    Awake, alert. Tolerating diet. NO n/v.         Objective     Vitals:   Temp:  [97.8 °F (36.6 °C)-98.6 °F (37 °C)] 98.5 °F (36.9 °C)  Heart Rate:  [76-95] 83  Resp:  [16] 16  BP: (124-149)/(61-71) 135/64  Physical Exam    Constitutional: Awake, alert   Eyes: PERRLA, sclerae anicteric, no conjunctival injection   HENT: NCAT, mucous membranes moist   Neck: Supple, no thyromegaly, no lymphadenopathy, trachea midline   Respiratory: Clear to auscultation bilaterally, nonlabored respirations    Cardiovascular: RRR, no murmurs, rubs, or gallops, palpable pedal pulses bilaterally   Gastrointestinal: Positive bowel sounds, soft, approp tender, nondistended   Musculoskeletal: No bilateral ankle edema, no clubbing or cyanosis to extremities   Psychiatric: Appropriate affect, cooperative   Neurologic: Oriented x 3, strength symmetric in all extremities, Cranial Nerves grossly intact to confrontation, speech clear   Skin: No rashes     Result Review    Result Review:  I have personally reviewed the results from the time of this admission to 11/15/2023 09:34 CST and agree with these findings:  []  Laboratory list / accordion  []  Microbiology  []  Radiology  []  EKG/Telemetry   []  Cardiology/Vascular   []  Pathology  []  Old records  []  Other:      Assessment & Plan   Assessment / Plan     Brief Patient Summary:  Abhishek Mendieta is a 77 y.o. male who is s/p lap cholecystectomy    Active Hospital Problems:  Active Hospital Problems    Diagnosis     **Acute cholecystitis      Plan:    Regular diet  May d/c home when medically cleared  F/u in clinic in 2 weeks. May shower, today. No baths. No heavy lifting>10lbs    DVT prophylaxis:  Mechanical DVT prophylaxis orders are present.    CODE STATUS:   Level Of Support Discussed With:  Patient  Code Status (Patient has no pulse and is not breathing): CPR (Attempt to Resuscitate)  Medical Interventions (Patient has pulse or is breathing): Full Support        MD Carmelina Thayer MD  General Surgery  11/15/23  09:34 CST

## 2023-11-15 NOTE — THERAPY DISCHARGE NOTE
Acute Care - Occupational Therapy Discharge Summary  [facility]     Patient Name: Abhishek Mendieta  : 1945  MRN: [id number]    Today's Date: 11/15/2023                 Admit Date: 2023        OT Recommendation and Plan    Visit Dx:    ICD-10-CM ICD-9-CM   1. Acute cholecystitis  K81.0 575.0   2. Acute kidney injury  N17.9 584.9   3. Impaired mobility [Z74.09]  Z74.09 799.89   4. Decreased activities of daily living (ADL) [Z78.9]  Z78.9 V49.89          Time Calculation- OT       Row Name 11/15/23 1145 11/15/23 1133          Time Calculation- OT    OT Start Time 0957  -TS --     OT Stop Time 1024  -TS --     OT Time Calculation (min) 27 min  -TS --     Total Timed Code Minutes- OT 27 minute(s)  -TS --     OT Received On 11/15/23  -TS --        Timed Charges    39842 - Gait Training Minutes  -- 18  -NW     23302 - OT Self Care/Mgmt Minutes 27  -TS --        Total Minutes    Timed Charges Total Minutes 27  -TS 18  -NW      Total Minutes 27  -TS 18  -NW               User Key  (r) = Recorded By, (t) = Taken By, (c) = Cosigned By      Initials Name Provider Type    TS Deana Washington COTA Occupational Therapist Assistant    NW Wen Moore, PRASAD Physical Therapist Assistant                       OT Rehab Goals       Row Name 11/15/23 1600             Bathing Goal 1 (OT)    Activity/Device (Bathing Goal 1, OT) bathing skills, all  -TS      [place] Level/Cues Needed (Bathing Goal 1, OT) minimum assist (75% or more patient effort);set-up required;verbal cues required  -TS      Time Frame (Bathing Goal 1, OT) long term goal (LTG);by discharge  -TS      Progress/Outcomes (Bathing Goal 1, OT) goal not met  -TS         Dressing Goal 1 (OT)    Activity/Device (Dressing Goal 1, OT) dressing skills, all  -TS      [place]/Cues Needed (Dressing Goal 1, OT) modified independence;set-up required  -TS      Time Frame (Dressing Goal 1, OT) long term goal (LTG);by discharge  -TS      Progress/Outcome  (Dressing Goal 1, OT) goal not met  -TS         Toileting Goal 1 (OT)    Activity/Device (Toileting Goal 1, OT) toileting skills, all  -TS      [place] Level/Cues Needed (Toileting Goal 1, OT) standby assist  -TS      Time Frame (Toileting Goal 1, OT) long term goal (LTG);by discharge  -TS      Progress/Outcome (Toileting Goal 1, OT) goal not met  -TS                User Key  (r) = Recorded By, (t) = Taken By, (c) = Cosigned By      Initials Name Provider Type Discipline    TS Deana Washington COTA Occupational Therapist Assistant OT                        Timed Therapy Charges  Total Units: 2      Suggested Charges  Total Units: 2      Procedure Name Documented Minutes Units Code    HC OT SELF CARE/MGMT/TRAIN EA 15 MIN 27 2   70090 (CPT®)                 Documented Minutes  Total Minutes: 27      Therapy Provided Minutes    57716 - OT Self Care/Mgmt Minutes 27                    Therapy Charges for Today       Code Description Service Date Service Provider Modifiers Qty    07849733081 HC OT SELF CARE/MGMT/TRAIN EA 15 MIN 11/15/2023 Deana Washington COTA GO 2            OT Discharge Summary  Anticipated Discharge Disposition (OT): home with assist  Reason for Discharge: Discharge from facility  Outcomes Achieved: Refer to plan of care for updates on goals achieved  Discharge Destination: Home with assist, Home with home health      VALERIE Jackson  11/15/2023

## 2023-11-15 NOTE — PLAN OF CARE
Goal Outcome Evaluation:  Plan of Care Reviewed With: patient  Progress: improving         Pt with no c/o pain this shift. IVF and IV abx infused per orders. Lap sites intact to abd. Up with asst. Voiding per urinal. Bed check in place. Tolerating diet. VSS. Safety maintained.

## 2023-11-15 NOTE — PLAN OF CARE
Goal Outcome Evaluation:  Plan of Care Reviewed With: patient        Progress: improving  Outcome Evaluation: Pt in bed to start tx session. Pt independently transferred to EOB to perform LB dressing. Pt required min A to don underwear and socks and required verbal cues for more independent effort. Pt independently transferred sit-to-stand with no RW. Pt ambulated to bathroom independently, but stand by assist to ensure safety due to pt cognitive deficits. Pt required CGA for stand-to-sit on toilet to ensure safety transitioning to a lower surface. Pt completed toileting routine with S due to impulsiveness and lack of safety awareness. Pt transferred and ambulated to bathroom sink SBA with HHA. Brushing teeth and washing hands required set-up, the remainder was indepedently done while PADILLA stood next to pt at side sink. Pt reported no pain pre or post treatment. Pt ambulated out of bathroom to begin PT tx. Continue OT POC to further address safety in ADLs and functional mobility.

## 2023-11-15 NOTE — THERAPY TREATMENT NOTE
Acute Care - Physical Therapy Treatment Note  [facility]     Patient Name: Abhishek Mendieta  : 1945  MRN: [id number]  Today's Date: 11/15/2023      Visit Dx:     ICD-10-CM ICD-9-CM   1. Acute cholecystitis  K81.0 575.0   2. Acute kidney injury  N17.9 584.9   3. Impaired mobility [Z74.09]  Z74.09 799.89   4. Decreased activities of daily living (ADL) [Z78.9]  Z78.9 V49.89     Patient Active Problem List   Diagnosis    Prediabetes    Essential hypertension    Hyperlipidemia    Gastroesophageal reflux disease    Benign prostatic hyperplasia    Moderate persistent asthma without complication    Vitamin D deficiency    History of prostate cancer    Acute cholecystitis     Past Medical History:   Diagnosis Date    Anxiety     Hypertension      Past Surgical History:   Procedure Laterality Date    CHOLECYSTECTOMY WITH INTRAOPERATIVE CHOLANGIOGRAM N/A 2023    Procedure: CHOLECYSTECTOMY LAPAROSCOPIC WITH POSSIBLE INTRAOPERATIVE CHOLANGIOGRAM;  Surgeon: Carmelina Roman MD;  Location: [facility];  Service: General;  Laterality: N/A;    PROSTATE SURGERY       PT Assessment (last 12 hours)       PT Evaluation and Treatment       Row Name 11/15/23 1017          Physical Therapy Time and Intention    Subjective Information no complaints  -NW     Document Type therapy note (daily note)  -     Mode of Treatment physical therapy  -       Row Name 11/15/23 1017          General Information    Existing Precautions/Restrictions fall  -       Row Name 11/15/23 1017          Pain    Pretreatment Pain Rating 0/10 - no pain  -       Row Name 11/15/23 1017          Bed Mobility    Comment, (Bed Mobility) up in BR w/ OT and to chair after rx  -[facility] Name 11/15/23 1017          Stand-Sit Transfer    Stand-Sit [place] (Transfers) verbal cues;contact guard  -       Row Name 11/15/23 1017          Gait/Stairs (Locomotion)    [place] Level (Gait) verbal cues;contact guard;standby assist  -     Assistive  Device (Gait) walker, front-wheeled  -NW     Distance in Feet (Gait) 400  -NW     Pattern (Gait) step-through  -NW     Comment, (Gait/Stairs) cues for safety  -NW       Row Name 11/15/23 1017          Safety Issues, Functional Mobility    Impairments Affecting Function (Mobility) cognition  -NW       Row Name             Wound 11/13/23 1414 abdomen Incision    Wound - Properties Group Placement Date: 11/13/23  -ZE Placement Time: 1414 -ZE Present on Original Admission: N  -ZE Location: abdomen  -ZE Primary Wound Type: Incision  -ZE    Retired Wound - Properties Group Placement Date: 11/13/23  -ZE Placement Time: 1414 -ZE Present on Original Admission: N  -ZE Location: abdomen  -ZE Primary Wound Type: Incision  -ZE    Retired Wound - Properties Group Date first assessed: 11/13/23  -ZE Time first assessed: 1414 -ZE Present on Original Admission: N  -ZE Location: abdomen  -ZE Primary Wound Type: Incision  -ZE      Row Name 11/15/23 1017          Positioning and Restraints    Pre-Treatment Position bathroom  -NW     Post Treatment Position chair  -NW     In Chair reclined;call light within reach;encouraged to call for assist;exit alarm on;notified ns  -NW               User Key  (r) = Recorded By, (t) = Taken By, (c) = Cosigned By      Initials Name Provider Type    Wen Winters PTA Physical Therapist Assistant    Sadiq Pierre RN Registered Nurse                    Physical Therapy Education       Title: PT OT SLP Therapies (In Progress)       Topic: Physical Therapy (In Progress)       Point: Mobility training (In Progress)       Learning Progress Summary             Patient Acceptance, E, NR by MS at 11/14/2023 7494    Comment: role of PT in his care                         Point: Home exercise program (Not Started)       Learner Progress:  Not documented in this visit.              Point: Body mechanics (Not Started)       Learner Progress:  Not documented in this visit.              Point:  Precautions (Not Started)       Learner Progress:  Not documented in this visit.                              User Key       Initials Effective Dates Name Provider Type Discipline    MS 07/11/23 -  Gila Luevano, PT, DPT, NCS Physical Therapist PT                  PT Recommendation and Plan     Plan of Care Reviewed With: patient  Progress: improving  Outcome Evaluation: Pt up in BR w/ OT. Pt had no c/o. Pt was able to amb hallway using rwx cga/sba 400' w/ no LOB. pt reports not having a rwx at home, pt may benefit from rwx for long distances. Feel pt is safe to d/c home w/ sister w/ HH when medically stable       Time Calculation:    PT Charges       Row Name 11/15/23 1133             Time Calculation    Start Time 1017  -NW      Stop Time 1035  -NW      Time Calculation (min) 18 min  -NW      PT Received On 11/15/23  -NW      PT Goal Re-Cert Due Date 11/24/23  -NW         Time Calculation- PT    Total Timed Code Minutes- PT 18 minute(s)  -NW         Timed Charges    25982 - Gait Training Minutes  18  -NW         Total Minutes    Timed Charges Total Minutes 18  -NW       Total Minutes 18  -NW                User Key  (r) = Recorded By, (t) = Taken By, (c) = Cosigned By      Initials Name Provider Type    NW Wen Moore PTA Physical Therapist Assistant                  Therapy Charges for Today       Code Description Service Date Service Provider Modifiers Qty    29956428427 HC GAIT TRAINING EA 15 MIN 11/15/2023 Wen Moore PTA GP 1            PT G-Codes  Outcome Measure Options: AM-PAC 6 Clicks Daily Activity (OT)  AM-PAC 6 Clicks Score (PT): 18  AM-PAC 6 Clicks Score (OT): 19    Wen Moore PTA  11/15/2023

## 2023-11-15 NOTE — THERAPY TREATMENT NOTE
Acute Care - Occupational Therapy Treatment Note  [facility]     Patient Name: Abhishek Mendieta  : 1945  MRN: [id number]  Today's Date: 11/15/2023             Admit Date: 2023       ICD-10-CM ICD-9-CM   1. Acute cholecystitis  K81.0 575.0   2. Acute kidney injury  N17.9 584.9   3. Impaired mobility [Z74.09]  Z74.09 799.89   4. Decreased activities of daily living (ADL) [Z78.9]  Z78.9 V49.89     Patient Active Problem List   Diagnosis    Prediabetes    Essential hypertension    Hyperlipidemia    Gastroesophageal reflux disease    Benign prostatic hyperplasia    Moderate persistent asthma without complication    Vitamin D deficiency    History of prostate cancer    Acute cholecystitis     Past Medical History:   Diagnosis Date    Anxiety     Hypertension      Past Surgical History:   Procedure Laterality Date    CHOLECYSTECTOMY WITH INTRAOPERATIVE CHOLANGIOGRAM N/A 2023    Procedure: CHOLECYSTECTOMY LAPAROSCOPIC WITH POSSIBLE INTRAOPERATIVE CHOLANGIOGRAM;  Surgeon: Cramelina Roman MD;  Location: [facility];  Service: General;  Laterality: N/A;    PROSTATE SURGERY           OT ASSESSMENT FLOWSHEET (last 12 hours)       OT Evaluation and Treatment       Row Name 11/15/23 0957                   OT Time and Intention    Subjective Information no complaints  -TS        Document Type therapy note (daily note)  -TS        Mode of Treatment occupational therapy  -TS        Patient Effort good  -TS        Symptoms Noted During/After Treatment none  -TS           General Information    Existing Precautions/Restrictions fall  -TS           Pain Assessment    Pretreatment Pain Rating 0/10 - no pain  -TS        Posttreatment Pain Rating 0/10 - no pain  -TS           Cognition    Orientation Status (Cognition) verbal cues/prompts needed for orientation;oriented x 3;person;place;situation  -TS        Personal Safety Interventions fall prevention program maintained;nonskid shoes/slippers when out of bed;supervised  activity  -TS           Activities of Daily Living    BADL Assessment/Intervention lower body dressing;grooming;toileting  -TS           Lower Body Dressing Assessment/Training    [place] Level (Lower Body Dressing) don;pants/bottoms;socks;set up;minimum assist (75% patient effort)  -TS        Position (Lower Body Dressing) edge of bed sitting;unsupported sitting;unsupported standing  -TS        Comment, (Lower Body Dressing) requested help several times but then could complete hiself  -TS           Grooming Assessment/Training    [place] Level (Grooming) oral care regimen;wash face, hands;set up;independent;standby assist  -TS        Position (Grooming) unsupported standing;sink side  -TS        Comment, (Grooming) did not use rolling walker  -TS           Toileting Assessment/Training    [place] Level (Toileting) toileting skills;perform perineal hygiene;supervision;adjust/manage clothing;standby assist  -TS        Assistive Devices (Toileting) commode;grab bar/safety frame  -TS        Position (Toileting) unsupported sitting;unsupported standing  -TS           BADL Safety/Performance    Impairments, BADL Safety/Performance other (see comments)  impulsive  -TS        Skilled BADL Treatment/Intervention BADL process/adaptation training  -TS           Bed Mobility    Bed Mobility rolling right;supine-sit  -TS        Supine-Sit [place] (Bed Mobility) independent  -TS           Functional Mobility    Functional Mobility- Ind. Level standby assist;independent  -TS        Functional Mobility-Distance (Feet) --  from bed to bathroom, bathroom to PT tx  -TS        Functional Mobility- Comment ambulated in room to BR with SBA/ hand held assist  -TS        Patient was able to Ambulate yes  -TS           Transfer Assessment/Treatment    Transfers sit-stand transfer;stand pivot/stand step transfer  -TS        Comment, (Transfers) transferred independently to bathroom, supervision to ensure safety. No rw  used.  -TS           Sit-Stand Transfer    Sit-Stand [place] (Transfers) independent  -TS           Stand-Sit Transfer    Stand-Sit [place] (Transfers) independent  -TS           Stand Pivot/Stand Step Transfer    Stand Pivot/Stand Step [place] (Transfers) independent  -TS           Safety Issues, Functional Mobility    Safety Issues Affecting Function (Mobility) impulsivity;insight into deficits/self-awareness;problem-solving;judgment  -TS        Impairments Affecting Function (Mobility) cognition  -TS        Cognitive Impairments, Mobility Safety/Performance attention;awareness, need for assistance;impulsivity;insight into deficits/self-awareness;judgment;problem-solving/reasoning  -TS           Wound 11/13/23 1414 abdomen Incision    Wound - Properties Group Placement Date: 11/13/23 -ZE Placement Time: 1414 -ZE Present on Original Admission: N  -ZE Location: abdomen  -ZE Primary Wound Type: Incision  -ZE    Retired Wound - Properties Group Placement Date: 11/13/23 -ZE Placement Time: 1414  -ZE Present on Original Admission: N  -ZE Location: abdomen  -ZE Primary Wound Type: Incision  -ZE    Retired Wound - Properties Group Date first assessed: 11/13/23 -ZE Time first assessed: 1414 -ZE Present on Original Admission: N  -ZE Location: abdomen  -ZE Primary Wound Type: Incision  -ZE       Coping    Observed Emotional State cooperative;happy;pleasant  -TS           Plan of Care Review    Plan of Care Reviewed With patient  -TS        Progress improving  -TS        Outcome Evaluation Pt in bed to start tx session. Pt independently transferred to EOB to perform LB dressing. Pt required min A to don underwear and socks and required verbal cues for more independent effort. Pt independently transferred sit-to-stand with no RW. Pt ambulated to bathroom independently, but stand by assist to ensure safety due to pt cognitive deficits. Pt required CGA for stand-to-sit on toilet to ensure safety transitioning  to a lower surface. Pt completed toileting routine with S due to impulsiveness and lack of safety awareness. Pt transferred and ambulated to bathroom sink SBA with HHA. Brushing teeth and washing hands required set-up, the remainder was indepedently done while PADILLA stood next to pt at side sink. Pt reported no pain pre or post treatment. Pt ambulated out of bathroom to begin PT tx. Continue OT POC to further address safety in ADLs and functional mobility.  -TS           Positioning and Restraints    Pre-Treatment Position in bed  -TS        Post Treatment Position other  standing with PT  -TS        Other Position with PT  -TS                  User Key  (r) = Recorded By, (t) = Taken By, (c) = Cosigned By      Initials Name Effective Dates    TS Deana Washington, PADILLA 02/03/23 -     Sadiq Pierre, RN 02/17/22 -                      Occupational Therapy Education       Title: PT OT SLP Therapies (In Progress)       Topic: Occupational Therapy (In Progress)       Point: ADL training (In Progress)       Description:   Instruct learner(s) on proper safety adaptation and remediation techniques during self care or transfers.   Instruct in proper use of assistive devices.                  Learning Progress Summary             Patient Acceptance, E, NR by TS at 11/15/2023 1144    Acceptance, E, NR by MM at 11/14/2023 1636                         Point: Home exercise program (Not Started)       Description:   Instruct learner(s) on appropriate technique for monitoring, assisting and/or progressing therapeutic exercises/activities.                  Learner Progress:  Not documented in this visit.              Point: Precautions (In Progress)       Description:   Instruct learner(s) on prescribed precautions during self-care and functional transfers.                  Learning Progress Summary             Patient Acceptance, E, NR by MM at 11/14/2023 1636                         Point: Body mechanics (In Progress)        Description:   Instruct learner(s) on proper positioning and spine alignment during self-care, functional mobility activities and/or exercises.                  Learning Progress Summary             Patient Acceptance, E, NR by MAGDALENA at 11/14/2023 8186                                         User Key       Initials Effective Dates Name Provider Type Discipline    TS 02/03/23 -  Deana Washington COTA Occupational Therapist Assistant OT    MAGDALENA 07/11/23 -  Reilly Hernandez, OTR/L Occupational Therapist OT                      OT Recommendation and Plan     Plan of Care Review  Plan of Care Reviewed With: patient  Progress: improving  Outcome Evaluation: Pt in bed to start tx session. Pt independently transferred to EOB to perform LB dressing. Pt required min A to don underwear and socks and required verbal cues for more independent effort. Pt independently transferred sit-to-stand with no RW. Pt ambulated to bathroom independently, but stand by assist to ensure safety due to pt cognitive deficits. Pt required CGA for stand-to-sit on toilet to ensure safety transitioning to a lower surface. Pt completed toileting routine with S due to impulsiveness and lack of safety awareness. Pt transferred and ambulated to bathroom sink SBA with HHA. Brushing teeth and washing hands required set-up, the remainder was indepedently done while PADILLA stood next to pt at side sink. Pt reported no pain pre or post treatment. Pt ambulated out of bathroom to begin PT tx. Continue OT POC to further address safety in ADLs and functional mobility.  Plan of Care Reviewed With: patient  Outcome Evaluation: Pt in bed to start tx session. Pt independently transferred to EOB to perform LB dressing. Pt required min A to don underwear and socks and required verbal cues for more independent effort. Pt independently transferred sit-to-stand with no RW. Pt ambulated to bathroom independently, but stand by assist to ensure safety due to pt cognitive  deficits. Pt required CGA for stand-to-sit on toilet to ensure safety transitioning to a lower surface. Pt completed toileting routine with S due to impulsiveness and lack of safety awareness. Pt transferred and ambulated to bathroom sink SBA with HHA. Brushing teeth and washing hands required set-up, the remainder was indepedently done while PADILLA stood next to pt at side sink. Pt reported no pain pre or post treatment. Pt ambulated out of bathroom to begin PT tx. Continue OT POC to further address safety in ADLs and functional mobility.        Time Calculation:    Time Calculation- OT       Row Name 11/15/23 1145 11/15/23 1133          Time Calculation- OT    OT Start Time 0957  -TS --     OT Stop Time 1024  -TS --     OT Time Calculation (min) 27 min  -TS --     Total Timed Code Minutes- OT 27 minute(s)  -TS --     OT Received On 11/15/23  -TS --        Timed Charges    85724 - Gait Training Minutes  -- 18  -NW     37123 - OT Self Care/Mgmt Minutes 27  -TS --        Total Minutes    Timed Charges Total Minutes 27  -TS 18  -NW      Total Minutes 27  -TS 18  -NW               User Key  (r) = Recorded By, (t) = Taken By, (c) = Cosigned By      Initials Name Provider Type    TS Deana Washington COTA Occupational Therapist Assistant    NW Wen Moore, PTA Physical Therapist Assistant                  Therapy Charges for Today       Code Description Service Date Service Provider Modifiers Qty    57169433460 HC OT SELF CARE/MGMT/TRAIN EA 15 MIN 11/15/2023 Deana Washington COTA GO 2                 Deana DE SANTIAGO. VALERIE Washington  11/15/2023

## 2023-11-15 NOTE — OUTREACH NOTE
Prep Survey      Flowsheet Row Responses   [facility] facility patient discharged from? [place]   Is LACE score < 7 ? No   Eligibility Readm Mgmt   Discharge diagnosis Lap kasandra   Does the patient have one of the following disease processes/diagnoses(primary or secondary)? General Surgery   Does the patient have Home health ordered? No   Is there a DME ordered? No   Prep survey completed? Yes            Madhuri ROSA - Registered Nurse

## 2023-11-15 NOTE — PROGRESS NOTES
[facility] Medicine Services  INPATIENT PROGRESS NOTE    Patient Name: Abhishek Mendieta  Date of Admission: 11/12/2023  Today's Date: 11/14/23  Length of Stay: 2  Primary Care Physician: Matt Garcia DO    Subjective             Objective    Temp:  [98.1 °F (36.7 °C)-98.7 °F (37.1 °C)] 98.3 °F (36.8 °C)  Heart Rate:  [76-98] 76  Resp:  [16] 16  BP: (124-138)/(60-71) 124/71    General: No acute distress  HEENT: normocephalic, atraumatic  Neck: Supple  CV: RRR  Lung: Clear to auscultation bilaterally.  No wheeze, rales, or rhonchi noted.  Abdominal exam: Positive bowel sounds, nontender, non distended  Extremity: No edema noted  Neurological exam: AAO x3. Cranial nerve II to XII grossly intact  Skin exam: Dry and warm  Psychiatric exam: Calm, cooperative, and normal affect       Results Review:  I have reviewed the labs, radiology results, and diagnostic studies.    Laboratory Data:   Results from last 7 days   Lab Units 11/13/23  0425 11/12/23  1326   WBC 10*3/mm3 17.19* 21.49*   HEMOGLOBIN g/dL 12.3* 14.4   HEMATOCRIT % 37.5 42.9   PLATELETS 10*3/mm3 186 197        Results from last 7 days   Lab Units 11/14/23  0420 11/13/23  0425 11/12/23  1326   SODIUM mmol/L 136 136 135*   POTASSIUM mmol/L 3.7 3.3* 3.2*   CHLORIDE mmol/L 102 101 97*   CO2 mmol/L 26.0 23.0 24.0   BUN mg/dL 20 31* 45*   CREATININE mg/dL 0.95 1.55* 2.78*   CALCIUM mg/dL 8.6 8.3* 9.2   BILIRUBIN mg/dL 0.6 1.2 1.1   ALK PHOS U/L 69 83 72   ALT (SGPT) U/L 31 27 17   AST (SGOT) U/L 33 36 23   GLUCOSE mg/dL 173* 159* 161*       Culture Data:   Blood Culture   Date Value Ref Range Status   11/12/2023 No growth at 2 days  Preliminary   11/12/2023 No growth at 2 days  Preliminary       Radiology Data:   Imaging Results (Last 24 Hours)       ** No results found for the last 24 hours. **            I have reviewed the patient's current medications.     Assessment/Plan   Assessment  Active Hospital Problems     Diagnosis     **Acute cholecystitis        Assessment and Plan:    Cholecystitis-status post lap kasandra 11/13/23:  -pain control  -ADAT  -bowel regimen    CHARLEEN, resolved:  -monitor      Dispo: home tomorrow pending return to bowel function, surgery has signed off    Richardson Marina MD 11/14/23, 18:13 CST.                  Treatment Plan      Medical Decision Making  Number and Complexity of problems:   Differential Diagnosis:     Conditions and Status             MDM Data  External documents reviewed:   Cardiac tracing (EKG, telemetry) interpretation:   Radiology interpretation:   Labs reviewed:   Any tests that were considered but not ordered:      Decision rules/scores evaluated (example [id number], Wells, etc):      Discussed with:      Care Planning  Shared decision making:   Code status and discussions:     Disposition  Social Determinants of Health that impact treatment or disposition:   I expect the patient to be discharged to  in  days.     Electronically signed by Richardsno Marina MD, 11/14/23, 18:13 CST.\

## 2023-11-15 NOTE — PLAN OF CARE
Goal Outcome Evaluation:  Plan of Care Reviewed With: patient        Progress: improving  Outcome Evaluation: Pt up in BR w/ OT. Pt had no c/o. Pt was able to amb hallway using rwx cga/sba 400' w/ no LOB. pt reports not having a rwx at home, pt may benefit from rwx for long distances. Feel pt is safe to d/c home w/ sister w/ HH when medically stable

## 2023-11-15 NOTE — THERAPY DISCHARGE NOTE
Acute Care - Physical Therapy Discharge Summary  [facility]       Patient Name: Abhishek Mendieta  : 1945  MRN: [id number]    Today's Date: 11/15/2023                 Admit Date: 2023      PT Recommendation and Plan    Visit Dx:    ICD-10-CM ICD-9-CM   1. Acute cholecystitis  K81.0 575.0   2. Acute kidney injury  N17.9 584.9   3. Impaired mobility [Z74.09]  Z74.09 799.89   4. Decreased activities of daily living (ADL) [Z78.9]  Z78.9 V49.89            PT Charges       Row Name 11/15/23 1133             Time Calculation    Start Time 1017  -NW      Stop Time 1035  -NW      Time Calculation (min) 18 min  -NW      PT Received On 11/15/23  -NW      PT Goal Re-Cert Due Date 23  -NW         Time Calculation- PT    Total Timed Code Minutes- PT 18 minute(s)  -NW         Timed Charges    12571 - Gait Training Minutes  18  -NW         Total Minutes    Timed Charges Total Minutes 18  -NW       Total Minutes 18  -NW                User Key  (r) = Recorded By, (t) = Taken By, (c) = Cosigned By      Initials Name Provider Type    Wen Winters, PTA Physical Therapist Assistant                     PT Rehab Goals       Row Name 11/15/23 1527             Bed Mobility Goal 1 (PT)    Activity/Assistive Device (Bed Mobility Goal 1, PT) bed mobility activities, all  -NW      [place] Level/Cues Needed (Bed Mobility Goal 1, PT) independent  -NW      Time Frame (Bed Mobility Goal 1, PT) long term goal (LTG);by discharge  -NW      Progress/Outcomes (Bed Mobility Goal 1, PT) goal not met  -NW         Transfer Goal 1 (PT)    Activity/Assistive Device (Transfer Goal 1, PT) sit-to-stand/stand-to-sit;bed-to-chair/chair-to-bed  -NW      [place] Level/Cues Needed (Transfer Goal 1, PT) independent  -NW      Time Frame (Transfer Goal 1, PT) long term goal (LTG);by discharge  -NW      Progress/Outcome (Transfer Goal 1, PT) goal not met  -NW         Gait Training Goal 1 (PT)    Activity/Assistive Device (Gait Training  Goal 1, PT) gait (walking locomotion);decrease fall risk;diminish gait deviation;improve balance and speed;increase endurance/gait distance  -NW      [place] Level (Gait Training Goal 1, PT) independent  -NW      Distance (Gait Training Goal 1, PT) 600ft  -NW      Time Frame (Gait Training Goal 1, PT) long term goal (LTG);by discharge  -NW      Progress/Outcome (Gait Training Goal 1, PT) goal not met  -NW         Stairs Goal 1 (PT)    Activity/Assistive Device (Stairs Goal 1, PT) ascending stairs;descending stairs  -NW      [place] Level/Cues Needed (Stairs Goal 1, PT) contact guard required  -NW      Number of Stairs (Stairs Goal 1, PT) 2  -NW      Time Frame (Stairs Goal 1, PT) long term goal (LTG);by discharge  -NW      Progress/Outcome (Stairs Goal 1, PT) goal not met  -NW         Patient Education Goal (PT)    Progress/Outcome (Patient Education Goal, PT) new goal  -NW                User Key  (r) = Recorded By, (t) = Taken By, (c) = Cosigned By      Initials Name Provider Type Discipline    NW Wen Moore PTA Physical Therapist Assistant PT                    Therapy Charges for Today       Code Description Service Date Service Provider Modifiers Qty    [id number] HC GAIT TRAINING EA 15 MIN 11/15/2023 Wen Moore PTA GP 1            PT Discharge Summary  Anticipated Discharge Disposition (PT): home  Reason for Discharge: Discharge from facility  Outcomes Achieved: Refer to plan of care for updates on goals achieved  Discharge Destination: Home      Wen Moore PTA   11/15/2023

## 2023-11-16 NOTE — DISCHARGE SUMMARY
[facility]  DISCHARGE SUMMARY       Date of Admission: 11/12/2023  Date of Discharge:  11/15/2023  Primary Care Physician: Matt Garcia DO    Presenting Problem/History of Present Illness:    Pt came in with cholecystitis and received cholecystectomy without complications.                          Final Discharge Diagnoses:  Active Hospital Problems    Diagnosis     **Acute cholecystitis        Consults:     Procedures Performed:     Pertinent Test Results:       Imaging Results (All)       Procedure Component Value Units Date/Time    XR Chest 1 View [[id number]] Collected: 11/13/23 0704     Updated: 11/13/23 0709    Narrative:      EXAMINATION: XR CHEST 1 VW- 11/13/2023 7:04 AM CST     HISTORY: Wheezing; K81.0-Acute cholecystitis; N17.9-Acute kidney  failure, unspecified.     COMPARISON: Chest x-ray 11/12/2023.     REPORT:  A frontal view of the chest was obtained. The lungs are mildly  hypoaerated, with bibasilar atelectasis, greater on the right. There is  mild elevation of the right hemidiaphragm as before. The cardiac and  mediastinal silhouettes are within normal limits. The visualized bony  thorax and upper abdomen are unremarkable.                                                                                                                                                       Impression:             Pulmonary hypoventilation with bibasilar atelectasis, greater on the  right. No acute abnormality.                                                                         This report was signed and finalized on 11/13/2023 7:06 AM CST by Dr. Hira England MD.       CT Abdomen Pelvis Without Contrast [[id number]] Collected: 11/12/23 1509     Updated: 11/12/23 1520    Narrative:      EXAM: CT ABDOMEN PELVIS WO CONTRAST-     INDICATION: abd pain, n/v, gfr less than 30        TECHNIQUE: Helically acquired CT images were obtained of the abdomen  and  pelvis without intravenous contrast. Coronal and sagittal reformations  were performed.        DOSE LENGTH PRODUCT: 197 mGy cm. Automated exposure control was also  utilized to decrease patient radiation dose.     COMPARISON: Non available.     FINDINGS:     Lower Chest: Trace pericardial fluid. Mild atelectasis in the right lung  base     Liver: Unremarkable.     Biliary Tree: Gallbladder is distended with mild gallbladder wall  thickening and pericholecystic stranding.     Spleen: Unremarkable.     Pancreas: Unremarkable.     Adrenal Glands: Unremarkable.     Kidneys/Ureters/Bladder: Unremarkable.     Reproductive Organs: Unremarkable.     Gastrointestinal Tract: Colonic diverticulosis. Stranding about the  hepatic flexure of the large bowel, likely reactive. Appendix not  visualized, however no secondary signs of acute appendicitis.     Lymphatics: Unremarkable.     Vasculature: Mild to moderate atherosclerosis.     Peritoneum/Retroperitoneum: No additional findings     Abdominal Wall/Soft Tissues: Small fat-containing left inguinal hernia.     Osseous Structures: No acute or suspicious osseous findings.       Impression:            Findings suspicious for acute cholecystitis demonstrated by distended  gallbladder, mild gallbladder wall thickening, and pericholecystic  stranding.        This report was signed and finalized on 11/12/2023 3:17 PM CST by Selvin Zambrano.       XR Chest 1 View [[id number]] Collected: 11/12/23 1418     Updated: 11/12/23 1424    Narrative:      XR CHEST 1 VW- 11/12/2023 1:28 PM CST     HISTORY: ams       COMPARISON: None     FINDINGS:  Upright frontal radiograph of the chest was obtained     Airspace disease in the right lung base with obscuration of the right  hemidiaphragm. The cardiomediastinal silhouette and pulmonary  vascularity are within normal limits. The osseous structures and  surrounding soft tissues demonstrate no acute abnormality.       Impression:      1.  Right  lower lobe airspace disease which obscures the right  hemidiaphragm, differential including pneumonia, atelectasis or perhaps  parenchymal scarring.     This report was signed and finalized on 11/12/2023 2:21 PM CST by Dr Rj Nuñez.             LAB RESULTS:      Lab 11/15/23  0601 11/13/23 0425 11/12/23  1849 11/12/23  1547 11/12/23  1326   WBC 10.50 17.19*  --   --  21.49*   HEMOGLOBIN 11.4* 12.3*  --   --  14.4   HEMATOCRIT 34.6* 37.5  --   --  42.9   PLATELETS 214 186  --   --  197   NEUTROS ABS  --  15.99*  --   --  18.65*   IMMATURE GRANS (ABS)  --  0.18*  --   --  0.16*   LYMPHS ABS  --  0.32*  --   --  0.71   MONOS ABS  --  0.68  --   --  1.92*   EOS ABS  --  0.00  --   --  0.01   MCV 95.1 94.5  --   --  94.7   LACTATE  --   --  1.9 2.3*  --    PROTIME  --   --   --   --  15.4*         Lab 11/15/23  0601 11/14/23 0420 11/13/23 0425 11/12/23  1326   SODIUM 138 136 136 135*   POTASSIUM 3.0* 3.7 3.3* 3.2*   CHLORIDE 103 102 101 97*   CO2 27.0 26.0 23.0 24.0   ANION GAP 8.0 8.0 12.0 14.0   BUN 15 20 31* 45*   CREATININE 0.86 0.95 1.55* 2.78*   EGFR 88.6 81.9 45.5* 22.6*   GLUCOSE 98 173* 159* 161*   CALCIUM 8.1* 8.6 8.3* 9.2   MAGNESIUM 1.8  --  2.0 2.2   PHOSPHORUS 1.9*  --   --   --          Lab 11/15/23  0601 11/14/23 0420 11/13/23 0425 11/12/23  1326   TOTAL PROTEIN  --  6.2 6.3 7.4   ALBUMIN 2.6* 3.0* 3.2* 3.7   GLOBULIN  --  3.2 3.1 3.7   ALT (SGPT)  --  31 27 17   AST (SGOT)  --  33 36 23   BILIRUBIN  --  0.6 1.2 1.1   ALK PHOS  --  69 83 72   LIPASE  --   --   --  24         Lab 11/12/23  1326   PROTIME 15.4*   INR 1.20*             Lab 11/13/23  0810   ABO TYPING B   RH TYPING Positive   ANTIBODY SCREEN Negative         Brief Urine Lab Results       None          Microbiology Results (last 10 days)       Procedure Component Value - Date/Time    Blood Culture - Blood, Arm, Left [[id number]]  (Normal) Collected: 11/12/23 0168    Lab Status: Preliminary result Specimen: Blood from Arm, Left  "Updated: 11/15/23 1600     Blood Culture No growth at 3 days    Blood Culture - Blood, Arm, Right [[id number]]  (Normal) Collected: 11/12/23 1547    Lab Status: Preliminary result Specimen: Blood from Arm, Right Updated: 11/15/23 1600     Blood Culture No growth at 3 days            Hospital Course:       Physical Exam on Discharge:  /58 (BP Location: Left arm, Patient Position: Sitting)   Pulse 72   Temp 97.7 °F (36.5 °C) (Oral)   Resp 16   Ht 177.8 cm (70\")   Wt 71.8 kg (158 lb 3.2 oz)   SpO2 98%   BMI 22.70 kg/m²   Physical Exam      Condition on Discharge:     Discharge Disposition:  Home or Self Care    Discharge Medications:     Discharge Medications        Continue These Medications        Instructions Start Date   Advair -21 MCG/ACT inhaler  Generic drug: fluticasone-salmeterol   2 puffs, Inhalation, 2 Times Daily - RT      albuterol sulfate  (90 Base) MCG/ACT inhaler  Commonly known as: PROVENTIL HFA;VENTOLIN HFA;PROAIR HFA   2 puffs, Inhalation, Every 4 Hours PRN      albuterol 1.25 MG/3ML nebulizer solution  Commonly known as: ACCUNEB   1.25 mg, Nebulization, Every 6 Hours PRN      aspirin 81 MG EC tablet   81 mg, Oral, Daily      cetirizine 10 MG tablet  Commonly known as: zyrTEC   10 mg, Oral, Daily      cholecalciferol 25 MCG (1000 UT) tablet  Commonly known as: VITAMIN D3   1,000 Units, Oral, Daily      hydroCHLOROthiazide 12.5 MG capsule  Commonly known as: MICROZIDE   12.5 mg, Oral, Every Morning      losartan 50 MG tablet  Commonly known as: COZAAR   50 mg, Oral, Daily      pantoprazole 40 MG EC tablet  Commonly known as: PROTONIX   40 mg, Oral, Daily      simvastatin 20 MG tablet  Commonly known as: ZOCOR   20 mg, Oral, Nightly      Spiriva Respimat 2.5 MCG/ACT aerosol solution inhaler  Generic drug: tiotropium bromide monohydrate   2 puffs, Inhalation, Daily - RT      tamsulosin 0.4 MG capsule 24 hr capsule  Commonly known as: FLOMAX   0.4 mg, Oral, Daily      vitamin C " 500 MG tablet  Commonly known as: ASCORBIC ACID   500 mg, Oral, Daily               This patient has current or prior documentation of an left ventricular ejection fraction (LVEF) of less than or equal to 40%.            Discharge Diet:     Activity at Discharge:     Follow-up Appointments:   Future Appointments   Date Time Provider Department [place]   11/28/2023  9:00 AM [facility] SHARED SCHEDULE GEN SURGERY PAD [facility]   1/5/2024  3:30 PM DENNIS Caputo MD [facility]       Test Results Pending at Discharge:     Electronically signed by Richardson Marina MD, 11/15/23, 19:15 CST.    Time:  minutes.

## 2023-11-16 NOTE — PAYOR COMM NOTE
"REF:  [id number]     [facility]  FAX  [phone]     Raulito Melo (77 y.o. Male)       Date of Birth   1945    Social Security Number       Address   [address] [place] [id number]    Home Phone   [phone]    MRN   [id number]       [employer]   [facility]    Marital Status   Single                            Admission Date   11/12/23    Admission Type   Emergency    Admitting Provider   Richardson Marina MD    Attending Provider       Department, Room/Bed   [facility] 3C, 389/1       Discharge Date   11/15/2023    Discharge Disposition   Home or Self Care    Discharge Destination                                 Attending Provider: (none)   Allergies: No Known Allergies    Isolation: None   Infection: None   Code Status: Prior    Ht: 177.8 cm (70\")   Wt: 71.8 kg (158 lb 3.2 oz)    Admission Cmt: None   Principal Problem: Acute cholecystitis [K81.0]                   Active Insurance as of 11/12/2023       Primary Coverage       Payor Plan Insurance Group Employer/Plan Group    HUMANA MEDICARE REPLACEMENT HUMANA MEDICARE REPLACEMENT [id number]       Payor Plan Address Payor Plan Phone Number Payor Plan Fax Number Effective Dates    [address] [address] [phone]  2/1/2023 - None Entered    Formerly [facility] [address]         Subscriber Name Subscriber Birth Date Member ID       RAULITO MELO 1945 [id number]               Secondary Coverage       Payor Plan Insurance Group Employer/Plan Group    [place]        Payor Plan Address Payor Plan Phone Number Payor Plan Fax Number Effective Dates    [address] [phone]  6/30/2023 - None Entered    [facility] [id number]         Subscriber Name Subscriber Birth Date Member ID       RAULITO MELO 1945 [id number]                     Emergency Contacts        (Rel.) Home Phone Work Phone Mobile Phone    WILL GOEL (Sister) [phone] -- [phone]                 Discharge Summary "        Richardson Marina MD at 11/15/23 1316                [facility] Medicine Services  DISCHARGE SUMMARY       Date of Admission: 11/12/2023  Date of Discharge:  11/15/2023  Primary Care Physician: Matt Garcia DO    Presenting Problem/History of Present Illness:    Pt came in with cholecystitis and received cholecystectomy without complications.                          Final Discharge Diagnoses:  Active Hospital Problems    Diagnosis     **Acute cholecystitis        Consults:     Procedures Performed:     Pertinent Test Results:       Imaging Results (All)       Procedure Component Value Units Date/Time    XR Chest 1 View [[id number]] Collected: 11/13/23 0704     Updated: 11/13/23 0709    Narrative:      EXAMINATION: XR CHEST 1 VW- 11/13/2023 7:04 AM CST     HISTORY: Wheezing; K81.0-Acute cholecystitis; N17.9-Acute kidney  failure, unspecified.     COMPARISON: Chest x-ray 11/12/2023.     REPORT:  A frontal view of the chest was obtained. The lungs are mildly  hypoaerated, with bibasilar atelectasis, greater on the right. There is  mild elevation of the right hemidiaphragm as before. The cardiac and  mediastinal silhouettes are within normal limits. The visualized bony  thorax and upper abdomen are unremarkable.                                                                                                                                                       Impression:             Pulmonary hypoventilation with bibasilar atelectasis, greater on the  right. No acute abnormality.                                                                         This report was signed and finalized on 11/13/2023 7:06 AM CST by Dr. Hira England MD.       CT Abdomen Pelvis Without Contrast [[id number]] Collected: 11/12/23 1509     Updated: 11/12/23 1520    Narrative:      EXAM: CT ABDOMEN PELVIS WO CONTRAST-     INDICATION: abd pain, n/v, gfr less than 30        TECHNIQUE: Helically  acquired CT images were obtained of the abdomen and  pelvis without intravenous contrast. Coronal and sagittal reformations  were performed.        DOSE LENGTH PRODUCT: 197 mGy cm. Automated exposure control was also  utilized to decrease patient radiation dose.     COMPARISON: Non available.     FINDINGS:     Lower Chest: Trace pericardial fluid. Mild atelectasis in the right lung  base     Liver: Unremarkable.     Biliary Tree: Gallbladder is distended with mild gallbladder wall  thickening and pericholecystic stranding.     Spleen: Unremarkable.     Pancreas: Unremarkable.     Adrenal Glands: Unremarkable.     Kidneys/Ureters/Bladder: Unremarkable.     Reproductive Organs: Unremarkable.     Gastrointestinal Tract: Colonic diverticulosis. Stranding about the  hepatic flexure of the large bowel, likely reactive. Appendix not  visualized, however no secondary signs of acute appendicitis.     Lymphatics: Unremarkable.     Vasculature: Mild to moderate atherosclerosis.     Peritoneum/Retroperitoneum: No additional findings     Abdominal Wall/Soft Tissues: Small fat-containing left inguinal hernia.     Osseous Structures: No acute or suspicious osseous findings.       Impression:            Findings suspicious for acute cholecystitis demonstrated by distended  gallbladder, mild gallbladder wall thickening, and pericholecystic  stranding.        This report was signed and finalized on 11/12/2023 3:17 PM CST by Selvin Zambrano.       XR Chest 1 View [[id number]] Collected: 11/12/23 1418     Updated: 11/12/23 1424    Narrative:      XR CHEST 1 VW- 11/12/2023 1:28 PM CST     HISTORY: ams       COMPARISON: None     FINDINGS:  Upright frontal radiograph of the chest was obtained     Airspace disease in the right lung base with obscuration of the right  hemidiaphragm. The cardiomediastinal silhouette and pulmonary  vascularity are within normal limits. The osseous structures and  surrounding soft tissues demonstrate no acute  abnormality.       Impression:      1.  Right lower lobe airspace disease which obscures the right  hemidiaphragm, differential including pneumonia, atelectasis or perhaps  parenchymal scarring.     This report was signed and finalized on 11/12/2023 2:21 PM CST by Dr Rj Nuñez.             LAB RESULTS:      Lab 11/15/23  0601 11/13/23 0425 11/12/23  1849 11/12/23  1547 11/12/23  1326   WBC 10.50 17.19*  --   --  21.49*   HEMOGLOBIN 11.4* 12.3*  --   --  14.4   HEMATOCRIT 34.6* 37.5  --   --  42.9   PLATELETS 214 186  --   --  197   NEUTROS ABS  --  15.99*  --   --  18.65*   IMMATURE GRANS (ABS)  --  0.18*  --   --  0.16*   LYMPHS ABS  --  0.32*  --   --  0.71   MONOS ABS  --  0.68  --   --  1.92*   EOS ABS  --  0.00  --   --  0.01   MCV 95.1 94.5  --   --  94.7   LACTATE  --   --  1.9 2.3*  --    PROTIME  --   --   --   --  15.4*         Lab 11/15/23  0601 11/14/23 0420 11/13/23 0425 11/12/23  1326   SODIUM 138 136 136 135*   POTASSIUM 3.0* 3.7 3.3* 3.2*   CHLORIDE 103 102 101 97*   CO2 27.0 26.0 23.0 24.0   ANION GAP 8.0 8.0 12.0 14.0   BUN 15 20 31* 45*   CREATININE 0.86 0.95 1.55* 2.78*   EGFR 88.6 81.9 45.5* 22.6*   GLUCOSE 98 173* 159* 161*   CALCIUM 8.1* 8.6 8.3* 9.2   MAGNESIUM 1.8  --  2.0 2.2   PHOSPHORUS 1.9*  --   --   --          Lab 11/15/23  0601 11/14/23 0420 11/13/23 0425 11/12/23  1326   TOTAL PROTEIN  --  6.2 6.3 7.4   ALBUMIN 2.6* 3.0* 3.2* 3.7   GLOBULIN  --  3.2 3.1 3.7   ALT (SGPT)  --  31 27 17   AST (SGOT)  --  33 36 23   BILIRUBIN  --  0.6 1.2 1.1   ALK PHOS  --  69 83 72   LIPASE  --   --   --  24         Lab 11/12/23  1326   PROTIME 15.4*   INR 1.20*             Lab 11/13/23  0810   ABO TYPING B   RH TYPING Positive   ANTIBODY SCREEN Negative         Brief Urine Lab Results       None          Microbiology Results (last 10 days)       Procedure Component Value - Date/Time    Blood Culture - Blood, Arm, Left [[id number]]  (Normal) Collected: 11/12/23 3223    Lab Status: Preliminary  "result Specimen: Blood from Arm, Left Updated: 11/15/23 1600     Blood Culture No growth at 3 days    Blood Culture - Blood, Arm, Right [[id number]]  (Normal) Collected: 11/12/23 1547    Lab Status: Preliminary result Specimen: Blood from Arm, Right Updated: 11/15/23 1600     Blood Culture No growth at 3 days            Hospital Course:       Physical Exam on Discharge:  /58 (BP Location: Left arm, Patient Position: Sitting)   Pulse 72   Temp 97.7 °F (36.5 °C) (Oral)   Resp 16   Ht 177.8 cm (70\")   Wt 71.8 kg (158 lb 3.2 oz)   SpO2 98%   BMI 22.70 kg/m²   Physical Exam      Condition on Discharge:     Discharge Disposition:  Home or Self Care    Discharge Medications:     Discharge Medications        Continue These Medications        Instructions Start Date   Advair -21 MCG/ACT inhaler  Generic drug: fluticasone-salmeterol   2 puffs, Inhalation, 2 Times Daily - RT      albuterol sulfate  (90 Base) MCG/ACT inhaler  Commonly known as: PROVENTIL HFA;VENTOLIN HFA;PROAIR HFA   2 puffs, Inhalation, Every 4 Hours PRN      albuterol 1.25 MG/3ML nebulizer solution  Commonly known as: ACCUNEB   1.25 mg, Nebulization, Every 6 Hours PRN      aspirin 81 MG EC tablet   81 mg, Oral, Daily      cetirizine 10 MG tablet  Commonly known as: zyrTEC   10 mg, Oral, Daily      cholecalciferol 25 MCG (1000 UT) tablet  Commonly known as: VITAMIN D3   1,000 Units, Oral, Daily      hydroCHLOROthiazide 12.5 MG capsule  Commonly known as: MICROZIDE   12.5 mg, Oral, Every Morning      losartan 50 MG tablet  Commonly known as: COZAAR   50 mg, Oral, Daily      pantoprazole 40 MG EC tablet  Commonly known as: PROTONIX   40 mg, Oral, Daily      simvastatin 20 MG tablet  Commonly known as: ZOCOR   20 mg, Oral, Nightly      Spiriva Respimat 2.5 MCG/ACT aerosol solution inhaler  Generic drug: tiotropium bromide monohydrate   2 puffs, Inhalation, Daily - RT      tamsulosin 0.4 MG capsule 24 hr capsule  Commonly known as: " FLOMAX   0.4 mg, Oral, Daily      vitamin C 500 MG tablet  Commonly known as: ASCORBIC ACID   500 mg, Oral, Daily               This patient has current or prior documentation of an left ventricular ejection fraction (LVEF) of less than or equal to 40%.            Discharge Diet:     Activity at Discharge:     Follow-up Appointments:   Future Appointments   Date Time Provider Department Center   [facility]   1/5/2024  3:30 PM DENNIS Caputo MD [facility] PAD PAD       Test Results Pending at Discharge:     Electronically signed by Richardson Alvarado MD, 11/15/23, 19:15 CST.    Time:  minutes.           Electronically signed by Richardson Alvarado MD at 11/15/23 1916       Discharge Order (From admission, onward)       Start     Ordered    11/15/23 1156  Discharge patient  Once        Expected Discharge Date: 11/15/23   Expected Discharge Time: Afternoon   Discharge Disposition: Home or Self Care   Physician of Record for Attribution - Please select from Treatment Team: RICHARDSON ALVARADO [[id number]]   Review needed by CMO to determine Physician of Record: No      Question Answer Comment   Physician of Record for Attribution - Please select from Treatment Team RICHARDSON ALVARADO    Review needed by CMO to determine Physician of Record No        11/15/23 1200

## 2023-11-17 ENCOUNTER — READMISSION MANAGEMENT (OUTPATIENT)
Dept: CALL CENTER | Facility: HOSPITAL | Age: 78
End: 2023-11-17
Payer: MEDICARE

## 2023-11-17 LAB
BACTERIA SPEC AEROBE CULT: NORMAL
BACTERIA SPEC AEROBE CULT: NORMAL
CYTO UR: NORMAL
LAB AP CASE REPORT: NORMAL
Lab: NORMAL
PATH REPORT.FINAL DX SPEC: NORMAL
PATH REPORT.GROSS SPEC: NORMAL

## 2023-11-17 NOTE — OUTREACH NOTE
General Surgery Week 1 Survey      Flowsheet Row Responses   [facility] facility patient discharged from? [place]   Does the patient have one of the following disease processes/diagnoses(primary or secondary)? General Surgery   Week 1 attempt successful? No   Unsuccessful attempts Attempt 1            Sherif CRUZ - Registered Nurse

## 2023-11-21 ENCOUNTER — READMISSION MANAGEMENT (OUTPATIENT)
Dept: CALL CENTER | Facility: HOSPITAL | Age: 78
End: 2023-11-21
Payer: MEDICARE

## 2023-11-21 NOTE — OUTREACH NOTE
General Surgery Week 1 Survey      Flowsheet Row Responses   [facility] patient discharged from? [place]   Does the patient have one of the following disease processes/diagnoses(primary or secondary)? General Surgery   Week 1 attempt successful? Yes   Call start time 1740   Call end time 1744   Is patient permission given to speak with other caregiver? Yes   Person spoke with today (if not patient) and relationship WILL GOEL Sister   Meds reviewed with patient/caregiver? Yes   Does the patient have all medications related to this admission filled (includes all antibiotics, pain medications, etc.) N/A  [No new meds ordered at discharge]   Is the patient taking all medications as directed (includes completed medication regime)? Yes   Does the patient have a follow up appointment scheduled with their surgeon? Yes  [11/28]   Has the patient kept scheduled appointments due by today? N/A   Has home health visited the patient within 72 hours of discharge? N/A   Psychosocial issues? No   Did the patient receive a copy of their discharge instructions? Yes   Nursing interventions Reviewed instructions with patient   What is the patient's perception of their health status since discharge? Improving   Is the patient /caregiver able to teach back basic post-op care? Keep incision areas clean,dry and protected   Is the patient/caregiver able to teach back signs and symptoms of incisional infection? Increased redness, swelling or pain at the incisonal site, Increased drainage or bleeding, Fever   Is the patient/caregiver able to teach back steps to recovery at home? Set small, achievable goals for return to baseline health, Rest and rebuild strength, gradually increase activity, Eat a well-balance diet   If the patient is a current smoker, are they able to teach back resources for cessation? Not a smoker   Is the patient/caregiver able to teach back the hierarchy of who to call/visit for symptoms/problems? PCP,  Specialist, Home health nurse, Urgent Care, ED, 911 Yes   Week 1 call completed? Yes   Is the patient interested in additional calls from an ambulatory ? No   Would this patient benefit from a Referral to [facility] Social Work? No   Call end time [id number]            BRISSA HERNÁNDEZ - Registered Nurse

## 2023-11-23 NOTE — PROGRESS NOTES
"Enter Query Response Below      Query Response: I did not have path reports when this note was written             If applicable, please update the problem list.   Patient: Abhishek Mendieta        : 1945  Account: [id number]           Admit Date: 2023        How to Respond to this query:       a. Click New Note     b. Answer query within the yellow box.                c. Update the Problem List, if applicable.    If you have any questions about this query contact me at: [email or web address]     Dr. Marina,     78 yo admitted per History and Physical for \"Acute cholecystitis with severe sepsis present at admission with end organ dysfunction-CHARLEEN.\" Also noted is \"IV fluid bolus as needed for hypotension.\"  Risk Factors: PMH includes Hypertension  Clinical indicators: Pathology reported \"Acute gangrenous cholecystitis superimposed on chronic cholecystitis.\"  Laboratory values reported as follows: 23 WBC 21.49, platelets 197, Creatinine 2.78, and lactate 2.3.   Vital signs reported as follows: /66, Temp 98.3, heart rate 102, respiratory rate 18, and SpO2 96%   Subsequent progress notes and Discharge Summary do not include sepsis.   Treatment: IV lactated ringers 3 liters on 23 IV Zosyn -11/15/23.  Laparoscopic cholecystectomy on 23.    Please clarify the following:    Sepsis causing CHARLEEN ruled in  Sepsis ruled out   Other- specify______  Unable to determine    By submitting this query, we are merely seeking further clarification of documentation to accurately reflect all conditions that you are monitoring, evaluating, treating or that extend the hospitalization or utilize additional resources of care. Please utilize your independent clinical judgment when addressing the question(s) above.     This query and your response, once completed, will be entered into the legal medical record.    Sincerely,  Guera LI RN CCDS  System Director Clinical Documentation Integrity Program "

## 2023-11-28 ENCOUNTER — OFFICE VISIT (OUTPATIENT)
Dept: SURGERY | Facility: CLINIC | Age: 78
End: 2023-11-28
Payer: MEDICARE

## 2023-11-28 VITALS
WEIGHT: 156 LBS | HEIGHT: 70 IN | BODY MASS INDEX: 22.33 KG/M2 | DIASTOLIC BLOOD PRESSURE: 60 MMHG | SYSTOLIC BLOOD PRESSURE: 127 MMHG

## 2023-11-28 DIAGNOSIS — K81.9 CHOLECYSTITIS: Primary | ICD-10-CM

## 2023-11-28 DIAGNOSIS — J45.40 MODERATE PERSISTENT ASTHMA WITHOUT COMPLICATION: ICD-10-CM

## 2023-11-28 PROCEDURE — 1159F MED LIST DOCD IN RCRD: CPT | Performed by: STUDENT IN AN ORGANIZED HEALTH CARE EDUCATION/TRAINING PROGRAM

## 2023-11-28 PROCEDURE — 3078F DIAST BP <80 MM HG: CPT | Performed by: STUDENT IN AN ORGANIZED HEALTH CARE EDUCATION/TRAINING PROGRAM

## 2023-11-28 PROCEDURE — 3074F SYST BP LT 130 MM HG: CPT | Performed by: STUDENT IN AN ORGANIZED HEALTH CARE EDUCATION/TRAINING PROGRAM

## 2023-11-28 PROCEDURE — 99024 POSTOP FOLLOW-UP VISIT: CPT | Performed by: STUDENT IN AN ORGANIZED HEALTH CARE EDUCATION/TRAINING PROGRAM

## 2023-11-28 PROCEDURE — 1160F RVW MEDS BY RX/DR IN RCRD: CPT | Performed by: STUDENT IN AN ORGANIZED HEALTH CARE EDUCATION/TRAINING PROGRAM

## 2023-11-28 RX ORDER — TIOTROPIUM BROMIDE INHALATION SPRAY 3.12 UG/1
2 SPRAY, METERED RESPIRATORY (INHALATION)
Qty: 4 G | Refills: 5 | OUTPATIENT
Start: 2023-11-28

## 2023-11-28 NOTE — PROGRESS NOTES
Office Established Patient Note:     Referring Provider: No ref. provider found    CC: Postop follow up visit    Subjective .     History of present illness:  Abhishek Mendieta is a 78 y.o. male presents for a post-op visit. He underwent laparoscopic cholecystectomy for gangrenous cholecystitis on 11.13.2023. He tolerated the procedure well and was dc'd without issues. He states that he is doing very well currently and denies having any issues. Path confirmed acute gangrenous cholecystitis.    History  Past Medical History:   Diagnosis Date    Anxiety     Hypertension    ,   Past Surgical History:   Procedure Laterality Date    CHOLECYSTECTOMY WITH INTRAOPERATIVE CHOLANGIOGRAM N/A 11/13/2023    Procedure: CHOLECYSTECTOMY LAPAROSCOPIC WITH POSSIBLE INTRAOPERATIVE CHOLANGIOGRAM;  Surgeon: Carmelina Roman MD;  Location: [facility];  Service: General;  Laterality: N/A;    PROSTATE SURGERY     ,   Family History   Problem Relation Age of Onset    Diabetes Mother     Heart disease Mother     Kidney disease Mother     Cancer Father     Asthma Brother     Cancer Brother    ,   Social History     Tobacco Use    Smoking status: Never    Smokeless tobacco: Never   Vaping Use    Vaping Use: Never used   Substance Use Topics    Alcohol use: Never    Drug use: Never   , (Not in a hospital admission)   and Allergies:  Patient has no known allergies.    Current Outpatient Medications:     albuterol (ACCUNEB) 1.25 MG/3ML nebulizer solution, Take 3 mL by nebulization Every 6 (Six) Hours As Needed for Wheezing., Disp: 100 each, Rfl: 3    albuterol sulfate  (90 Base) MCG/ACT inhaler, Inhale 2 puffs Every 4 (Four) Hours As Needed for Wheezing., Disp: 18 g, Rfl: 5    aspirin 81 MG EC tablet, Take 1 tablet by mouth Daily., Disp: 30 tablet, Rfl: 5    cetirizine (zyrTEC) 10 MG tablet, Take 1 tablet by mouth Daily., Disp: 30 tablet, Rfl: 11    cholecalciferol (VITAMIN D3) 25 MCG (1000 UT) tablet, Take 1 tablet by mouth Daily., Disp: 30  "tablet, Rfl: 5    fluticasone-salmeterol (Advair HFA) 115-21 MCG/ACT inhaler, Inhale 2 puffs 2 (Two) Times a Day., Disp: 12 g, Rfl: 11    hydroCHLOROthiazide (MICROZIDE) 12.5 MG capsule, Take 1 capsule by mouth Every Morning., Disp: 30 capsule, Rfl: 5    losartan (COZAAR) 50 MG tablet, Take 1 tablet by mouth Daily., Disp: 30 tablet, Rfl: 5    pantoprazole (PROTONIX) 40 MG EC tablet, Take 1 tablet by mouth Daily., Disp: 30 tablet, Rfl: 5    simvastatin (ZOCOR) 20 MG tablet, Take 1 tablet by mouth Every Night., Disp: 30 tablet, Rfl: 5    tamsulosin (FLOMAX) 0.4 MG capsule 24 hr capsule, Take 1 capsule by mouth Daily., Disp: 30 capsule, Rfl: 5    tiotropium bromide monohydrate (Spiriva Respimat) 2.5 MCG/ACT aerosol solution inhaler, Inhale 2 puffs Daily., Disp: 4 g, Rfl: 5    vitamin C (ASCORBIC ACID) 500 MG tablet, Take 1 tablet by mouth Daily., Disp: 30 tablet, Rfl: 5    Objective     Vital Signs   /60   Ht 177.8 cm (70\")   Wt 70.8 kg (156 lb)   BMI 22.38 kg/m²      ROS:  Review of Systems   Constitutional: Negative.    HENT: Negative.     Eyes: Negative.    Respiratory: Negative.     Cardiovascular: Negative.    Endocrine: Negative.    Genitourinary: Negative.    Musculoskeletal: Negative.    Skin: Negative.    Allergic/Immunologic: Negative.    Neurological: Negative.    Hematological: Negative.    Psychiatric/Behavioral: Negative.         Physical Exam:  Physical Exam  Vitals and nursing note reviewed.   Constitutional:       General: He is not in acute distress.     Appearance: Normal appearance. He is not ill-appearing, toxic-appearing or diaphoretic.   HENT:      Head: Normocephalic and atraumatic.      Right Ear: External ear normal.      Left Ear: External ear normal.      Nose: Nose normal.   Eyes:      Extraocular Movements: Extraocular movements intact.      Conjunctiva/sclera: Conjunctivae normal.   Cardiovascular:      Rate and Rhythm: Normal rate and regular rhythm.      Pulses: Normal pulses. " "  Pulmonary:      Effort: Pulmonary effort is normal.   Abdominal:      General: There is no distension.      Palpations: Abdomen is soft.      Comments: Non-tender to palpation  Incisions intact without notable incisional hernias   Musculoskeletal:         General: Normal range of motion.      Cervical back: Normal range of motion.   Skin:     General: Skin is warm and dry.      Capillary Refill: Capillary refill takes less than 2 seconds.      Coloration: Skin is not jaundiced.   Neurological:      General: No focal deficit present.      Mental Status: He is alert.   Psychiatric:         Mood and Affect: Mood normal.       Results Review:  Result Review :           Lab Results   Component Value Date    CASEREPORT  11/13/2023     Surgical Pathology Report                         Case: [id number]                                  Authorizing Provider:  Carmelina Roman MD        Collected:           11/13/2023 02:49 PM          Ordering Location:     [facility] OR  Received:            11/13/2023 03:26 PM          Pathologist:           Kassi Beaver MD                                                        Specimen:    Gallbladder, Gallbladder and Contents                                                      FINALDX  11/13/2023     Gallbladder, cholecystectomy:  A.  Acute gangrenous cholecystitis superimposed on chronic cholecystitis.  B.  No histologic evidence of malignancy.      GROSSDES  11/13/2023     1. Gallbladder.   Received in a formalin filled container labeled with the patient's name, date of birth, and \"gallbladder and contents\".  The specimen consists of a previously opened gallbladder measuring 8.3 x 4.2 x 1.8 cm.  The serosal surface is yellow-tan and dusky with focal areas of yellow-green wall thinning.  The cystic duct appears patent.  The gallbladder wall averages 0.2 cm in thickness.  The majority of the mucosa is green-brown and necrotic.  Focal areas within the wall narrowed to " less than 0.1 cm in thickness and appear to exhibit full-thickness necrosis.  No stones are identified.  Representative sections of gallbladder wall and the cystic duct are submitted in block 1A.        MICRO  11/13/2023     Microscopic examination was performed.              Assessment & Plan       Diagnoses and all orders for this visit:    1. Cholecystitis (Primary)    78M presents for postop up f/u visit s/p lap ccy on 11.13.2023. Doing well. Path: acute gangrenous cholecystitis.    F/u PRN         Brent Morales Jr, MD  11/28/23  09:04 CST

## 2023-11-28 NOTE — TELEPHONE ENCOUNTER
Rx Refill Note  Requested Prescriptions     Pending Prescriptions Disp Refills    Spiriva Respimat 2.5 MCG/ACT aerosol solution inhaler [Pharmacy Med Name: SPIRIVA RESPIMAT 2.5 SPRY 2.5 Aerosol] 4 g 5     Sig: INHALE 2 PUFFS INTO LUNGS EVERY DAY      Last office visit with prescribing clinician: 12/30/2022   Last telemedicine visit with prescribing clinician: Visit date not found   Next office visit with prescribing clinician: 1/5/2024                         Would you like a call back once the refill request has been completed: [] Yes [] No    If the office needs to give you a call back, can they leave a voicemail: [] Yes [] No    Anuel Wilson MA  11/28/23, 13:22 CST

## 2023-11-29 ENCOUNTER — READMISSION MANAGEMENT (OUTPATIENT)
Dept: CALL CENTER | Facility: HOSPITAL | Age: 78
End: 2023-11-29
Payer: MEDICARE

## 2023-11-29 NOTE — OUTREACH NOTE
General Surgery Week 2 Survey      Flowsheet Row Responses   [facility] facility patient discharged from? [place]   Does the patient have one of the following disease processes/diagnoses(primary or secondary)? General Surgery   Week 2 attempt successful? No   Unsuccessful attempts Attempt 1            Amarilis DOWNING - Registered Nurse

## 2024-02-09 RX ORDER — IPRATROPIUM BROMIDE AND ALBUTEROL SULFATE 2.5; .5 MG/3ML; MG/3ML
SOLUTION RESPIRATORY (INHALATION)
Refills: 11 | OUTPATIENT
Start: 2024-02-09

## 2024-08-08 NOTE — PROGRESS NOTES
A medical records request from [facility] was received 7/31/24. A date range was not included in request.    Given to Rhona for review.   Tried to call patient. Unable to leave a voicemail. Will try again later.

## 2025-07-22 ENCOUNTER — TRANSCRIBE ORDERS (OUTPATIENT)
Dept: ADMINISTRATIVE | Facility: HOSPITAL | Age: 80
End: 2025-07-22
Payer: MEDICAID

## 2025-07-22 DIAGNOSIS — J44.9 CHRONIC OBSTRUCTIVE PULMONARY DISEASE, UNSPECIFIED COPD TYPE: Primary | ICD-10-CM

## 2025-08-04 ENCOUNTER — HOSPITAL ENCOUNTER (OUTPATIENT)
Dept: PULMONOLOGY | Facility: HOSPITAL | Age: 80
Discharge: HOME | End: 2025-08-04
Admitting: INTERNAL MEDICINE
Payer: MEDICARE

## 2025-08-04 DIAGNOSIS — J44.9 CHRONIC OBSTRUCTIVE PULMONARY DISEASE, UNSPECIFIED COPD TYPE: ICD-10-CM

## 2025-08-04 PROCEDURE — 94010 BREATHING CAPACITY TEST: CPT

## 2025-08-04 PROCEDURE — 94060 EVALUATION OF WHEEZING: CPT

## 2025-08-04 PROCEDURE — 94726 PLETHYSMOGRAPHY LUNG VOLUMES: CPT

## 2025-08-04 RX ORDER — ALBUTEROL SULFATE 0.83 MG/ML
2.5 SOLUTION RESPIRATORY (INHALATION) ONCE
Status: DISCONTINUED | OUTPATIENT
Start: 2025-08-04 | End: 2025-08-04

## 2025-08-05 PROCEDURE — 94010 BREATHING CAPACITY TEST: CPT | Performed by: INTERNAL MEDICINE

## 2025-08-05 PROCEDURE — 94726 PLETHYSMOGRAPHY LUNG VOLUMES: CPT | Performed by: INTERNAL MEDICINE

## 2025-08-25 ENCOUNTER — TRANSCRIBE ORDERS (OUTPATIENT)
Dept: ADMINISTRATIVE | Facility: HOSPITAL | Age: 80
End: 2025-08-25
Payer: MEDICARE

## 2025-08-25 DIAGNOSIS — J98.8 OTHER SPECIFIED RESPIRATORY DISORDERS: Primary | ICD-10-CM

## (undated) DEVICE — NDL HYPO PRECISIONGLIDE REG 21G 1 1/2

## (undated) DEVICE — ENDOPATH XCEL WITH OPTIVIEW TECHNOLOGY UNIVERSAL TROCAR STABILITY SLEEVES: Brand: ENDOPATH XCEL OPTIVIEW

## (undated) DEVICE — THE ECHELON FLEX POWERED PLUS ARTICULATING ENDOSCOPIC LINEAR CUTTERS ARE STERILE, SINGLE PATIENT USE INSTRUMENTS THAT SIMULTANEOUSLYCUT AND STAPLE TISSUE. THERE ARE SIX STAGGERED ROWS OF STAPLES, THREE ON EITHER SIDE OF THE CUT LINE. THE ECHELON FLEX 45 POWERED PLUSINSTRUMENTS HAVE A STAPLE LINE THAT IS APPROXIMATELY 45 MM LONG AND A CUT LINE THAT IS APPROXIMATELY 42 MM LONG. THE SHAFT CAN ROTATE FREELYIN BOTH DIRECTIONS AND AN ARTICULATION MECHANISM ENABLES THE DISTAL PORTION OF THE SHAFT TO PIVOT TO FACILITATE LATERAL ACCESS TO THE OPERATIVESITE.THE INSTRUMENTS ARE PACKAGED WITH A PRIMARY LITHIUM BATTERY PACK THAT MUST BE INSTALLED PRIOR TO USE. THERE ARE SPECIFIC REQUIREMENTS FORDISPOSING OF THE BATTERY PACK. REFER TO THE BATTERY PACK DISPOSAL SECTION.THE INSTRUMENTS ARE PACKAGED WITHOUT A RELOAD AND MUST BE LOADED PRIOR TO USE. A STAPLE RETAINING CAP ON THE RELOAD PROTECTS THE STAPLE LEGPOINTS DURING SHIPPING AND TRANSPORTATION. THE INSTRUMENTS’ LOCK-OUT FEATURE IS DESIGNED TO PREVENT A USED OR IMPROPERLY INSTALLED RELOADFROM BEING REFIRED OR AN INSTRUMENT FROM BEING FIRED WITHOUT A RELOAD.: Brand: ECHELON FLEX

## (undated) DEVICE — ENDOPATH XCEL DILATING TIP TROCARS WITH STABILITY SLEEVES: Brand: ENDOPATH XCEL

## (undated) DEVICE — APPL HEMO SURG ARISTA/AH/FLEXITIP XL 38CM

## (undated) DEVICE — ENDOPATH PNEUMONEEDLE INSUFFLATION NEEDLES WITH LUER LOCK CONNECTORS 120MM: Brand: ENDOPATH

## (undated) DEVICE — MONOPOLAR METZENBAUM SCISSOR, MINI BLADE TIP, DISPOSABLE: Brand: MONOPOLAR METZENBAUM SCISSOR, MINI BLADE TIP, DISPOSABLE

## (undated) DEVICE — ENDOPATH XCEL WITH OPTIVIEW TECHNOLOGY DILATING TIP TROCARS WITH STABILITY SLEEVES: Brand: ENDOPATH XCEL OPTIVIEW

## (undated) DEVICE — GLV SURG SENSICARE W/ALOE PF LF 6.5 STRL

## (undated) DEVICE — RESERVOIR,SUCTION,100CC,SILICONE: Brand: MEDLINE

## (undated) DEVICE — 2, DISPOSABLE SUCTION/IRRIGATOR WITHOUT DISPOSABLE TIP: Brand: STRYKEFLOW

## (undated) DEVICE — ENDOPOUCH RETRIEVER SPECIMEN RETRIEVAL BAGS: Brand: ENDOPOUCH RETRIEVER

## (undated) DEVICE — TRAP FLD MINIVAC MEGADYNE 100ML

## (undated) DEVICE — TOWEL,OR,DSP,ST,BLUE,STD,4/PK,20PK/CS: Brand: MEDLINE

## (undated) DEVICE — SUT MNCRYL 4/0 PS2 27IN UD MCP426H

## (undated) DEVICE — GLV SURG SENSICARE W/ALOE PF LF SZ6 STRL

## (undated) DEVICE — DRN JP RND NO TROC SIL 10F 1/8IN

## (undated) DEVICE — SUT VIC 0 UR6 27IN VCP603H

## (undated) DEVICE — PAD LAP CHOLE: Brand: MEDLINE INDUSTRIES, INC.

## (undated) DEVICE — SYR LUERLOK 30CC

## (undated) DEVICE — DISSCT SECTO 1PC 1P/U 5MMX35CM STRL

## (undated) DEVICE — 4-PORT MANIFOLD: Brand: NEPTUNE 2